# Patient Record
Sex: MALE | Race: WHITE | Employment: OTHER | URBAN - METROPOLITAN AREA
[De-identification: names, ages, dates, MRNs, and addresses within clinical notes are randomized per-mention and may not be internally consistent; named-entity substitution may affect disease eponyms.]

---

## 2017-01-01 ENCOUNTER — PATIENT OUTREACH (OUTPATIENT)
Dept: FAMILY MEDICINE CLINIC | Age: 77
End: 2017-01-01

## 2017-01-01 ENCOUNTER — TELEPHONE (OUTPATIENT)
Dept: FAMILY MEDICINE CLINIC | Age: 77
End: 2017-01-01

## 2017-01-01 ENCOUNTER — PATIENT OUTREACH (OUTPATIENT)
Dept: INTERNAL MEDICINE CLINIC | Age: 77
End: 2017-01-01

## 2017-01-01 ENCOUNTER — DOCUMENTATION ONLY (OUTPATIENT)
Dept: FAMILY MEDICINE CLINIC | Age: 77
End: 2017-01-01

## 2017-01-01 ENCOUNTER — OFFICE VISIT (OUTPATIENT)
Dept: FAMILY MEDICINE CLINIC | Age: 77
End: 2017-01-01

## 2017-01-01 ENCOUNTER — HOSPITAL ENCOUNTER (OUTPATIENT)
Dept: ULTRASOUND IMAGING | Age: 77
Discharge: HOME | End: 2017-09-06
Attending: INTERNAL MEDICINE
Payer: MEDICARE

## 2017-01-01 ENCOUNTER — DOCUMENTATION ONLY (OUTPATIENT)
Dept: INTERNAL MEDICINE CLINIC | Age: 77
End: 2017-01-01

## 2017-01-01 VITALS
SYSTOLIC BLOOD PRESSURE: 168 MMHG | BODY MASS INDEX: 24.11 KG/M2 | WEIGHT: 150 LBS | DIASTOLIC BLOOD PRESSURE: 63 MMHG | OXYGEN SATURATION: 96 % | HEIGHT: 66 IN | TEMPERATURE: 97.9 F | HEART RATE: 58 BPM | RESPIRATION RATE: 22 BRPM

## 2017-01-01 VITALS
DIASTOLIC BLOOD PRESSURE: 53 MMHG | WEIGHT: 119 LBS | HEART RATE: 60 BPM | SYSTOLIC BLOOD PRESSURE: 126 MMHG | TEMPERATURE: 98 F | RESPIRATION RATE: 16 BRPM | BODY MASS INDEX: 19.21 KG/M2 | OXYGEN SATURATION: 97 %

## 2017-01-01 VITALS
HEIGHT: 66 IN | OXYGEN SATURATION: 98 % | HEART RATE: 52 BPM | RESPIRATION RATE: 16 BRPM | WEIGHT: 150 LBS | TEMPERATURE: 98.4 F | DIASTOLIC BLOOD PRESSURE: 60 MMHG | SYSTOLIC BLOOD PRESSURE: 165 MMHG | BODY MASS INDEX: 24.11 KG/M2

## 2017-01-01 VITALS
DIASTOLIC BLOOD PRESSURE: 73 MMHG | RESPIRATION RATE: 18 BRPM | TEMPERATURE: 98 F | HEART RATE: 59 BPM | OXYGEN SATURATION: 95 % | SYSTOLIC BLOOD PRESSURE: 157 MMHG

## 2017-01-01 VITALS
DIASTOLIC BLOOD PRESSURE: 70 MMHG | TEMPERATURE: 97.9 F | HEART RATE: 66 BPM | RESPIRATION RATE: 18 BRPM | OXYGEN SATURATION: 97 % | SYSTOLIC BLOOD PRESSURE: 176 MMHG

## 2017-01-01 VITALS
OXYGEN SATURATION: 97 % | HEART RATE: 55 BPM | DIASTOLIC BLOOD PRESSURE: 54 MMHG | TEMPERATURE: 97.9 F | HEIGHT: 66 IN | RESPIRATION RATE: 18 BRPM | SYSTOLIC BLOOD PRESSURE: 140 MMHG | BODY MASS INDEX: 24.11 KG/M2 | WEIGHT: 150 LBS

## 2017-01-01 VITALS
DIASTOLIC BLOOD PRESSURE: 56 MMHG | SYSTOLIC BLOOD PRESSURE: 142 MMHG | OXYGEN SATURATION: 98 % | TEMPERATURE: 98.5 F | HEART RATE: 54 BPM | RESPIRATION RATE: 18 BRPM | HEIGHT: 66 IN

## 2017-01-01 VITALS
RESPIRATION RATE: 18 BRPM | TEMPERATURE: 98 F | DIASTOLIC BLOOD PRESSURE: 66 MMHG | SYSTOLIC BLOOD PRESSURE: 159 MMHG | HEART RATE: 79 BPM | OXYGEN SATURATION: 96 %

## 2017-01-01 DIAGNOSIS — S78.112A UNILATERAL AKA, LEFT (HCC): ICD-10-CM

## 2017-01-01 DIAGNOSIS — Z23 ENCOUNTER FOR IMMUNIZATION: ICD-10-CM

## 2017-01-01 DIAGNOSIS — G20 PARKINSON DISEASE (HCC): ICD-10-CM

## 2017-01-01 DIAGNOSIS — D68.9 COAGULATION DEFICIENCY (HCC): Primary | ICD-10-CM

## 2017-01-01 DIAGNOSIS — L89.91: Primary | ICD-10-CM

## 2017-01-01 DIAGNOSIS — I73.9 PVD (PERIPHERAL VASCULAR DISEASE) (HCC): Primary | ICD-10-CM

## 2017-01-01 DIAGNOSIS — I82.503 CHRONIC DEEP VEIN THROMBOSIS (DVT) OF BOTH LOWER EXTREMITIES, UNSPECIFIED VEIN (HCC): ICD-10-CM

## 2017-01-01 DIAGNOSIS — I10 ESSENTIAL HYPERTENSION: Chronic | ICD-10-CM

## 2017-01-01 DIAGNOSIS — N18.30 CRI (CHRONIC RENAL INSUFFICIENCY), STAGE 3 (MODERATE) (HCC): Chronic | ICD-10-CM

## 2017-01-01 DIAGNOSIS — N18.30 CKD (CHRONIC KIDNEY DISEASE) STAGE 3, GFR 30-59 ML/MIN (HCC): ICD-10-CM

## 2017-01-01 DIAGNOSIS — I48.20 CHRONIC ATRIAL FIBRILLATION (HCC): ICD-10-CM

## 2017-01-01 DIAGNOSIS — D68.9 COAGULATION DEFICIENCY (HCC): ICD-10-CM

## 2017-01-01 DIAGNOSIS — L89.301: Primary | ICD-10-CM

## 2017-01-01 LAB
INR BLD: 1.2
INR BLD: 1.3
INR BLD: 1.7
INR BLD: 2
INR BLD: 2
INR BLD: 2.1
INR BLD: 3.8
INR BLD: 4.6
PT POC: 24.5 SEC
PT POC: 25.5 SECONDS
PT POC: NORMAL SEC
PT POC: NORMAL SECONDS
VALID INTERNAL CONTROL?: YES

## 2017-01-01 PROCEDURE — 76770 US EXAM ABDO BACK WALL COMP: CPT

## 2017-01-01 RX ORDER — HYDRALAZINE HYDROCHLORIDE 50 MG/1
25 TABLET, FILM COATED ORAL 3 TIMES DAILY
COMMUNITY

## 2017-01-01 RX ORDER — LANOLIN ALCOHOL/MO/W.PET/CERES
CREAM (GRAM) TOPICAL
COMMUNITY

## 2017-01-01 RX ORDER — POTASSIUM CHLORIDE 20 MEQ/1
TABLET, EXTENDED RELEASE ORAL
Qty: 120 TAB | Refills: 3 | Status: SHIPPED | OUTPATIENT
Start: 2017-01-01 | End: 2017-01-01 | Stop reason: SDUPTHER

## 2017-01-01 RX ORDER — ACETAMINOPHEN 500 MG
500 TABLET ORAL
Qty: 100 TAB | Refills: 3 | Status: SHIPPED | OUTPATIENT
Start: 2017-01-01

## 2017-01-01 RX ORDER — DIGOXIN 125 MCG
TABLET ORAL DAILY
COMMUNITY

## 2017-01-01 RX ORDER — METOPROLOL TARTRATE 50 MG/1
TABLET ORAL 2 TIMES DAILY
COMMUNITY

## 2017-01-01 RX ORDER — FACIAL-BODY WIPES
10 EACH TOPICAL
COMMUNITY

## 2017-01-01 RX ORDER — AMLODIPINE BESYLATE 5 MG/1
TABLET ORAL
Qty: 90 TAB | Refills: 3 | Status: SHIPPED | OUTPATIENT
Start: 2017-01-01

## 2017-01-01 RX ORDER — FUROSEMIDE 40 MG/1
TABLET ORAL
COMMUNITY
Start: 2017-01-01

## 2017-01-01 RX ORDER — WARFARIN 6 MG/1
TABLET ORAL
Qty: 30 TAB | Refills: 3 | Status: SHIPPED | OUTPATIENT
Start: 2017-01-01

## 2017-01-01 RX ORDER — DULOXETIN HYDROCHLORIDE 30 MG/1
30 CAPSULE, DELAYED RELEASE ORAL
COMMUNITY

## 2017-01-01 RX ORDER — OXYCODONE AND ACETAMINOPHEN 5; 325 MG/1; MG/1
1 TABLET ORAL
Qty: 90 TAB | Refills: 0 | Status: SHIPPED | OUTPATIENT
Start: 2017-01-01

## 2017-01-01 RX ORDER — WARFARIN SODIUM 5 MG/1
TABLET ORAL
Qty: 90 TAB | Refills: 0 | Status: SHIPPED | OUTPATIENT
Start: 2017-01-01 | End: 2017-01-01

## 2017-01-01 RX ORDER — MONTELUKAST SODIUM 4 MG/1
TABLET, CHEWABLE ORAL
Qty: 60 TAB | Refills: 5 | Status: SHIPPED | OUTPATIENT
Start: 2017-01-01

## 2017-01-01 RX ORDER — OXYCODONE AND ACETAMINOPHEN 5; 325 MG/1; MG/1
1 TABLET ORAL
COMMUNITY
End: 2017-01-01 | Stop reason: SDUPTHER

## 2017-01-01 RX ORDER — WARFARIN 6 MG/1
6 TABLET ORAL DAILY
Qty: 30 TAB | Refills: 5 | Status: SHIPPED | OUTPATIENT
Start: 2017-01-01

## 2017-01-03 NOTE — MR AVS SNAPSHOT
Visit Information Date & Time Provider Department Dept. Phone Encounter #  
 1/3/2017  2:20 PM Franchesca Sanchez MD [address] [facility] [phone] [id number] Follow-up Instructions Return in about 2 weeks (around 1/17/2017). Upcoming Health Maintenance Date Due DTaP/Tdap/Td series (1 - Tdap) 9[birth year]19/1959 GLAUCOMA SCREENING Q2Y 8/6/2016 MEDICARE YEARLY EXAM 11/22/2017 Allergies as of 1/3/2017  Review Complete On: 1/3/2017 By: Franchesca Sanchez MD  
  
 Severity Noted Reaction Type Reactions Morphine  05/13/2010    Other (comments) Mental change  hallucinations Pcn [Penicillins]  05/12/2010    Hives Current Immunizations  Reviewed on 10/10/2016 Name Date Influenza High Dose Vaccine PF 10/10/2016 Influenza Vaccine 9/25/2013 Influenza Vaccine Karole Андрей) 10/12/2015 Influenza Vaccine Split 10/23/2012, 11/7/2011, 11/3/2010 Pneumococcal Conjugate (PCV-13) 10/12/2015 Pneumococcal Vaccine (Unspecified Type) 8/4/2009 Zoster Vaccine, Live 9/25/2013 Not reviewed this visit You Were Diagnosed With   
  
 Codes Comments Coagulation deficiency ([facility]    -  Primary ICD-10-CM: D69.9 ICD-9-CM: 286. 9 Vitals BP Pulse Temp Resp Height(growth percentile) Weight(growth percentile) 140/54 (!) 55 97.9 °F (36.6 °C) 18 5' 6\" (1.676 m) 150 lb (68 kg) SpO2 BMI Smoking Status 97% 24.21 kg/m2 Former Smoker BMI and BSA Data Body Mass Index Body Surface Area  
 24.21 kg/m 2 1.78 m 2 Preferred Pharmacy Pharmacy Name Phone [address] [facility] [phone] Your Updated Medication List  
  
   
This list is accurate as of: 1/3/17  2:44 PM.  Always use your most recent med list.  
  
  
  
  
 ALPRAZolam 0.25 mg tablet Commonly known as:  XANAX  
TAKE 1 TABLET BY MOUTH TWICE A DAY AS NEEDED FOR ANXIETY  
  
 amiodarone 200 mg tablet Commonly known as:  CORDARONE  
TAKE 1 TABLET BY MOUTH DAILY  
  
 amLODIPine 5 mg tablet Commonly known as:  [place] TAKE 1 TABLET BY MOUTH DAILY AZILECT 1 mg tablet Generic drug:  rasagiline Take 1 mg by mouth daily. TAKES AT NOON DAILY  
  
 carbidopa-levodopa  mg per tablet Commonly known as:  SINEMET  
TAKE 1 TABLET BY MOUTH THREE TIMES A DAY CENTRUM SILVER Tab tablet Generic drug:  multivitamins-minerals-lutein Take 1 Tab by mouth daily. clopidogrel 75 mg Tab Commonly known as:  PLAVIX Take 1 Tab by mouth daily. colestipol 1 gram tablet Commonly known as:  COLESTID  
TAKE 1 TABLET BY MOUTH TWICE A DAY  
  
 * digoxin 0.25 mg tablet Commonly known as:  LANOXIN  
TAKE 1 TABLET BY MOUTH DAILY * digoxin 0.25 mg tablet Commonly known as:  LANOXIN  
TAKE 1 TABLET BY MOUTH DAILY  
  
 gabapentin 300 mg capsule Commonly known as:  NEURONTIN  
TAKE 1 CAPSULE BY MOUTH NIGHTLY  
  
 hydroCHLOROthiazide 25 mg tablet Commonly known as:  HYDRODIURIL Take 25 mg by mouth daily. Indications: HYPERTENSION  
  
 * KLOR-CON 10 10 mEq tablet Generic drug:  potassium chloride SR Take 10 mEq by mouth every evening. * KLOR-CON M20 20 mEq tablet Generic drug:  potassium chloride TAKE 2 TABLETS BY MOUTH TWICE A DAY  
  
 LIPITOR 40 mg tablet Generic drug:  atorvastatin Take  by mouth nightly. metoprolol succinate 25 mg XL tablet Commonly known as:  TOPROL-XL  
TAKE 1 TABLET EVERY DAY  
  
 metoprolol tartrate 25 mg tablet Commonly known as:  LOPRESSOR Take 12.5 mg by mouth two (2) times a day. pantoprazole 40 mg tablet Commonly known as:  PROTONIX  
TAKE 1 TABLET BY MOUTH EVERY DAY  
  
 raNITIdine 150 mg tablet Commonly known as:  ZANTAC TAKE 1 TABLET BY MOUTH TWICE A DAY  
  
 VITAMIN C 500 mg tablet Generic drug:  ascorbic acid (vitamin C) Take 500 mg by mouth daily. * warfarin 2 mg tablet Commonly known as:  COUMADIN  
TAKE 2 AND 1/2 TABLETS BY MOUTH DAILY * warfarin 5 mg tablet Commonly known as:  COUMADIN  
TAKE 1 TABLET BY MOUTH EVERY DAY  
  
 * warfarin 3 mg tablet Commonly known as:  COUMADIN  
TAKE 1 TABLET BY MOUTH DIALY * warfarin 5 mg tablet Commonly known as:  COUMADIN  
TAKE 1 TABLET BY MOUTH EVERY DAY  
  
 * Notice: This list has 8 medication(s) that are the same as other medications prescribed for you. Read the directions carefully, and ask your doctor or other care provider to review them with you. Follow-up Instructions Return in about 2 weeks (around 1/17/2017). Introducing hospitals & [facility] SERVICES! Dear Gabbi Zapata: Thank you for requesting a [employer] account. Our records indicate that you have previously registered for a [employer] account but its currently inactive. Please call our [employer] support line at [phone]. Additional Information If you have questions, please visit the Frequently Asked Questions section of the [employer] website at [email or web address] Remember, [employer] is NOT to be used for urgent needs. For medical emergencies, dial 911. Now available from your iPhone and Android! Please provide this summary of care documentation to your next provider. Your primary care clinician is listed as ALFREDO LANDRY. If you have any questions after today's visit, please call [phone].

## 2017-01-03 NOTE — PROGRESS NOTES
Chief Complaint   Patient presents with    Coagulation disorder     5 mg daily     1. Have you been to the ER, urgent care clinic since your last visit? Hospitalized since your last visit? No    2. Have you seen or consulted any other health care providers outside of the [facility] [place] since your last visit? Include any pap smears or colon screening. No     Results for orders placed or performed in visit on 01/03/17   AMB POC PT/INR   Result Value Ref Range    VALID INTERNAL CONTROL POC Yes     Prothrombin time (POC)  sec    INR POC 1.3      Chief Complaint   Patient presents with    Coagulation disorder     5 mg daily     he is a 64y.o. year old male who presents for evalution. Reviewed PmHx, RxHx, FmHx, SocHx, AllgHx and updated and dated in the chart.     Patient Active Problem List    Diagnosis    Advance care planning     Has LW      DVT (deep venous thrombosis) (HCC)    AF (atrial fibrillation) (HCC)    GI bleed    Leucocytosis    Abdominal pain    Cellulitis of leg, right    TIA (transient ischemic attack)    Other peripheral vascular disease(443.89)    Renal failure    UTI (urinary tract infection)    Urinary retention    Parkinson disease ([facility]    Weakness of left leg    Hyperkalemia    Anemia    Insomnia    Hypercholesteremia    HTN (hypertension)    PVD (peripheral vascular disease) (HCC)    Carotid stenosis    GERD (gastroesophageal reflux disease)    CRI (chronic renal insufficiency)       Review of Systems - negative except as listed above in the HPI    Objective:     Vitals:    01/03/17 1434   BP: 140/54   Pulse: (!) 55   Resp: 18   Temp: 97.9 °F (36.6 °C)   SpO2: 97%   Weight: 150 lb (68 kg)   Height: 5' 6\" (1.676 m)     Physical Examination: General appearance - alert, well appearing, and in no distress  Chest - clear to auscultation, no wheezes, rales or rhonchi, symmetric air entry  Heart - normal rate, regular rhythm, normal S1, S2, no murmurs, rubs, clicks or gallops      Assessment/ Plan:   Yenifer Ku was seen today for coagulation disorder. Diagnoses and all orders for this visit:    Coagulation deficiency ([facility]  -     AMB POC PT/INR  -too thin at 1.3  -take double dose today and check in 2 weeks     Follow-up Disposition:  Return in about 2 weeks (around 1/17/2017). I have discussed the diagnosis with the patient and the intended plan as seen in the above orders. The patient understands and agrees with the plan. The patient has received an after-visit summary and questions were answered concerning future plans. Medication Side Effects and Warnings were discussed with patient  Patient Labs were reviewed and or requested:  Patient Past Records were reviewed and or requested    Ibis Cameron M.D. There are no Patient Instructions on file for this visit.

## 2017-01-18 NOTE — PROGRESS NOTES
Chief Complaint   Patient presents with    Coagulation disorder     1. Have you been to the ER, urgent care clinic since your last visit? Hospitalized since your last visit? No    2. Have you seen or consulted any other health care providers outside of the [address] [place] [address] since your last visit? Include any pap smears or colon screening. No     Results for orders placed or performed in visit on 01/18/17   AMB POC PT/INR   Result Value Ref Range    VALID INTERNAL CONTROL POC Yes     Prothrombin time (POC)  sec    INR POC 1.7      Chief Complaint   Patient presents with    Coagulation disorder     he is a 64y.o. year old male who presents for evalution. Reviewed PmHx, RxHx, FmHx, SocHx, AllgHx and updated and dated in the chart. Patient Active Problem List    Diagnosis    Advance care planning     Has LW      DVT (deep venous thrombosis) (HCC)    AF (atrial fibrillation) (HCC)    GI bleed    Leucocytosis    Abdominal pain    Cellulitis of leg, right    TIA (transient ischemic attack)    Other peripheral vascular disease(443.89)    Renal failure    UTI (urinary tract infection)    Urinary retention    Parkinson disease ([address]    Weakness of left leg    Hyperkalemia    Anemia    Insomnia    Hypercholesteremia    HTN (hypertension)    PVD (peripheral vascular disease) (HCC)    Carotid stenosis    GERD (gastroesophageal reflux disease)    CRI (chronic renal insufficiency)       Review of Systems - negative except as listed above in the HPI    Objective:     Vitals:    01/18/17 1612   BP: 157/73   Pulse: (!) 59   Resp: 18   Temp: 98 °F (36.7 °C)   SpO2: 95%     Physical Examination: General appearance - alert, well appearing, and in no distress      Assessment/ Plan:   John Sheffield was seen today for coagulation disorder. Diagnoses and all orders for this visit:    Coagulation deficiency ([address]  -     AMB POC PT/INR  -     warfarin (COUMADIN) 6 mg tablet;  Take 1 Tab by mouth daily.  -  Results for orders placed or performed in visit on 01/18/17   AMB POC PT/INR   Result Value Ref Range    VALID INTERNAL CONTROL POC Yes     Prothrombin time (POC)  sec    INR POC 1.7      -inc dose to 6mg   -not at goal    Chronic deep vein thrombosis (DVT) of both lower extremities, unspecified vein (HCC)  -     warfarin (COUMADIN) 6 mg tablet; Take 1 Tab by mouth daily. Follow-up Disposition:  Return in about 10 days (around 1/28/2017). I have discussed the diagnosis with the patient and the intended plan as seen in the above orders. The patient understands and agrees with the plan. The patient has received an after-visit summary and questions were answered concerning future plans. Medication Side Effects and Warnings were discussed with patient  Patient Labs were reviewed and or requested:  Patient Past Records were reviewed and or requested    Saurabh Carpenter M.D. There are no Patient Instructions on file for this visit.

## 2017-01-18 NOTE — MR AVS SNAPSHOT
Visit Information Date & Time Provider Department Dept. Phone Encounter #  
 1/18/2017  1:45 PM Daysi Fisher MD [address] [facility] [phone] [id number] Follow-up Instructions Return in about 10 days (around 1/28/2017). Upcoming Health Maintenance Date Due DTaP/Tdap/Td series (1 - Tdap) 9[birth year]/1959 GLAUCOMA SCREENING Q2Y 8/6/2016 MEDICARE YEARLY EXAM 11/22/2017 Allergies as of 1/18/2017  Review Complete On: 1/18/2017 By: Daysi Fisher MD  
  
 Severity Noted Reaction Type Reactions Morphine  05/13/2010    Other (comments) Mental change  hallucinations Pcn [Penicillins]  05/12/2010    Hives Current Immunizations  Reviewed on 10/10/2016 Name Date Influenza High Dose Vaccine PF 10/10/2016 Influenza Vaccine 9/25/2013 Influenza Vaccine Mittie Shouts) 10/12/2015 Influenza Vaccine Split 10/23/2012, 11/7/2011, 11/3/2010 Pneumococcal Conjugate (PCV-13) 10/12/2015 Pneumococcal Vaccine (Unspecified Type) 8/4/2009 Zoster Vaccine, Live 9/25/2013 Not reviewed this visit You Were Diagnosed With   
  
 Codes Comments Coagulation deficiency ([facility]    -  Primary ICD-10-CM: D69.9 ICD-9-CM: 286. 9 Chronic deep vein thrombosis (DVT) of both lower extremities, unspecified vein (HCC)     ICD-10-CM: N46.871 ICD-9-CM: 453.50 Vitals BP Pulse Temp Resp SpO2 Smoking Status 157/73 (!) 59 98 °F (36.7 °C) 18 95% Former Smoker Preferred Pharmacy Pharmacy Name Phone [id number] [address] [phone] Your Updated Medication List  
  
   
This list is accurate as of: 1/18/17  4:25 PM.  Always use your most recent med list.  
  
  
  
  
 ALPRAZolam 0.25 mg tablet Commonly known as:  XANAX  
TAKE 1 TABLET BY MOUTH TWICE A DAY AS NEEDED FOR ANXIETY  
  
 amiodarone 200 mg tablet Commonly known as:  CORDARONE  
TAKE 1 TABLET BY MOUTH DAILY  
  
 amLODIPine 5 mg tablet Commonly known as:  [place] TAKE 1 TABLET BY MOUTH DAILY AZILECT 1 mg tablet Generic drug:  rasagiline Take 1 mg by mouth daily. TAKES AT NOON DAILY  
  
 carbidopa-levodopa  mg per tablet Commonly known as:  SINEMET  
TAKE 1 TABLET BY MOUTH THREE TIMES A DAY CENTRUM SILVER Tab tablet Generic drug:  multivitamins-minerals-lutein Take 1 Tab by mouth daily. clopidogrel 75 mg Tab Commonly known as:  PLAVIX Take 1 Tab by mouth daily. colestipol 1 gram tablet Commonly known as:  COLESTID  
TAKE 1 TABLET BY MOUTH TWICE A DAY  
  
 * digoxin 0.25 mg tablet Commonly known as:  LANOXIN  
TAKE 1 TABLET BY MOUTH DAILY * digoxin 0.25 mg tablet Commonly known as:  LANOXIN  
TAKE 1 TABLET BY MOUTH DAILY  
  
 gabapentin 300 mg capsule Commonly known as:  NEURONTIN  
TAKE 1 CAPSULE BY MOUTH NIGHTLY  
  
 hydroCHLOROthiazide 25 mg tablet Commonly known as:  HYDRODIURIL Take 25 mg by mouth daily. Indications: HYPERTENSION  
  
 * KLOR-CON 10 10 mEq tablet Generic drug:  potassium chloride SR Take 10 mEq by mouth every evening. * KLOR-CON M20 20 mEq tablet Generic drug:  potassium chloride TAKE 2 TABLETS BY MOUTH TWICE A DAY  
  
 LIPITOR 40 mg tablet Generic drug:  atorvastatin Take  by mouth nightly. metoprolol succinate 25 mg XL tablet Commonly known as:  TOPROL-XL  
TAKE 1 TABLET EVERY DAY  
  
 metoprolol tartrate 25 mg tablet Commonly known as:  LOPRESSOR Take 12.5 mg by mouth two (2) times a day. pantoprazole 40 mg tablet Commonly known as:  PROTONIX  
TAKE 1 TABLET BY MOUTH EVERY DAY  
  
 raNITIdine 150 mg tablet Commonly known as:  ZANTAC TAKE 1 TABLET BY MOUTH TWICE A DAY  
  
 VITAMIN C 500 mg tablet Generic drug:  ascorbic acid (vitamin C) Take 500 mg by mouth daily. * warfarin 2 mg tablet Commonly known as:  COUMADIN  
TAKE 2 AND 1/2 TABLETS BY MOUTH DAILY * warfarin 5 mg tablet Commonly known as:  COUMADIN  
TAKE 1 TABLET BY MOUTH EVERY DAY  
  
 * warfarin 3 mg tablet Commonly known as:  COUMADIN  
TAKE 1 TABLET BY MOUTH DIALY * warfarin 5 mg tablet Commonly known as:  COUMADIN  
TAKE 1 TABLET BY MOUTH EVERY DAY  
  
 * warfarin 6 mg tablet Commonly known as:  COUMADIN Take 1 Tab by mouth daily. * Notice: This list has 9 medication(s) that are the same as other medications prescribed for you. Read the directions carefully, and ask your doctor or other care provider to review them with you. Prescriptions Printed Refills  
 warfarin (COUMADIN) 6 mg tablet 5 Sig: Take 1 Tab by mouth daily. Class: Print Route: Oral  
  
We Performed the Following AMB POC PT/INR [51528 CPT(R)] Follow-up Instructions Return in about 10 days (around 1/28/2017). Introducing [facility] [place] & HEALTH SERVICES! Dear Bee Sheldon: Thank you for requesting a [employer] account. Our records indicate that you have previously registered for a [employer] account but its currently inactive. Please call our [employer] support line at [phone]. Additional Information If you have questions, please visit the Frequently Asked Questions section of the [employer] website at [email or web address] Remember, [employer] is NOT to be used for urgent needs. For medical emergencies, dial 911. Now available from your iPhone and Android! Please provide this summary of care documentation to your next provider. Your primary care clinician is listed as ALFREDO LANDRY. If you have any questions after today's visit, please call [phone].

## 2017-01-26 NOTE — MR AVS SNAPSHOT
Visit Information Date & Time Provider Department Dept. Phone Encounter #  
 1/26/2017  2:10 PM Ailny Ellsworth MD [address] [facility] [phone] [id number] Follow-up Instructions Return in about 2 weeks (around 2/9/2017). Upcoming Health Maintenance Date Due DTaP/Tdap/Td series (1 - Tdap) 9/19/1961 GLAUCOMA SCREENING Q2Y 8/6/2016 MEDICARE YEARLY EXAM 11/22/2017 Allergies as of 1/26/2017  Review Complete On: 1/26/2017 By: Ailyn Ellsworth MD  
  
 Severity Noted Reaction Type Reactions Morphine  05/13/2010    Other (comments) Mental change  hallucinations Pcn [Penicillins]  05/12/2010    Hives Current Immunizations  Reviewed on 10/10/2016 Name Date Influenza High Dose Vaccine PF 10/10/2016 Influenza Vaccine 9/25/2013 Influenza Vaccine Anne Chelsey) 10/12/2015 Influenza Vaccine Split 10/23/2012, 11/7/2011, 11/3/2010 Pneumococcal Conjugate (PCV-13) 10/12/2015 Pneumococcal Vaccine (Unspecified Type) 8/4/2009 Zoster Vaccine, Live 9/25/2013 Not reviewed this visit You Were Diagnosed With   
  
 Codes Comments Coagulation deficiency ([facility]    -  Primary ICD-10-CM: D69.9 ICD-9-CM: 286. 9 Vitals BP Pulse Temp Resp Height(growth percentile) Weight(growth percentile)  
 168/63 (!) 58 97.9 °F (36.6 °C) 22 5' 6\" (1.676 m) 150 lb (68 kg) SpO2 BMI Smoking Status 96% 24.21 kg/m2 Former Smoker BMI and BSA Data Body Mass Index Body Surface Area  
 24.21 kg/m 2 1.78 m 2 Preferred Pharmacy Pharmacy Name Phone [id number] [address] [phone] Your Updated Medication List  
  
   
This list is accurate as of: 1/26/17  2:54 PM.  Always use your most recent med list.  
  
  
  
  
 ALPRAZolam 0.25 mg tablet Commonly known as:  XANAX  
TAKE 1 TABLET BY MOUTH TWICE A DAY AS NEEDED FOR ANXIETY  
  
 amiodarone 200 mg tablet Commonly known as:  CORDARONE  
TAKE 1 TABLET BY MOUTH DAILY  
  
 amLODIPine 5 mg tablet Commonly known as:  Patrice Ruths TAKE 1 TABLET BY MOUTH DAILY AZILECT 1 mg tablet Generic drug:  rasagiline Take 1 mg by mouth daily. TAKES AT NOON DAILY  
  
 carbidopa-levodopa  mg per tablet Commonly known as:  SINEMET  
TAKE 1 TABLET BY MOUTH THREE TIMES A DAY CENTRUM SILVER Tab tablet Generic drug:  multivitamins-minerals-lutein Take 1 Tab by mouth daily. clopidogrel 75 mg Tab Commonly known as:  PLAVIX Take 1 Tab by mouth daily. colestipol 1 gram tablet Commonly known as:  COLESTID  
TAKE 1 TABLET BY MOUTH TWICE A DAY  
  
 * digoxin 0.25 mg tablet Commonly known as:  LANOXIN  
TAKE 1 TABLET BY MOUTH DAILY * digoxin 0.25 mg tablet Commonly known as:  LANOXIN  
TAKE 1 TABLET BY MOUTH DAILY  
  
 gabapentin 300 mg capsule Commonly known as:  NEURONTIN  
TAKE 1 CAPSULE BY MOUTH NIGHTLY  
  
 hydroCHLOROthiazide 25 mg tablet Commonly known as:  HYDRODIURIL Take 25 mg by mouth daily. Indications: HYPERTENSION  
  
 * KLOR-CON 10 10 mEq tablet Generic drug:  potassium chloride SR Take 10 mEq by mouth every evening. * KLOR-CON M20 20 mEq tablet Generic drug:  potassium chloride TAKE 2 TABLETS BY MOUTH TWICE A DAY  
  
 LIPITOR 40 mg tablet Generic drug:  atorvastatin Take  by mouth nightly. metoprolol succinate 25 mg XL tablet Commonly known as:  TOPROL-XL  
TAKE 1 TABLET EVERY DAY  
  
 metoprolol tartrate 25 mg tablet Commonly known as:  LOPRESSOR Take 12.5 mg by mouth two (2) times a day. pantoprazole 40 mg tablet Commonly known as:  PROTONIX  
TAKE 1 TABLET BY MOUTH EVERY DAY  
  
 raNITIdine 150 mg tablet Commonly known as:  ZANTAC TAKE 1 TABLET BY MOUTH TWICE A DAY  
  
 VITAMIN C 500 mg tablet Generic drug:  ascorbic acid (vitamin C) Take 500 mg by mouth daily. * warfarin 2 mg tablet Commonly known as:  COUMADIN  
TAKE 2 AND 1/2 TABLETS BY MOUTH DAILY * warfarin 5 mg tablet Commonly known as:  COUMADIN  
TAKE 1 TABLET BY MOUTH EVERY DAY  
  
 * warfarin 3 mg tablet Commonly known as:  COUMADIN  
TAKE 1 TABLET BY MOUTH DIALY * warfarin 5 mg tablet Commonly known as:  COUMADIN  
TAKE 1 TABLET BY MOUTH EVERY DAY  
  
 * warfarin 6 mg tablet Commonly known as:  COUMADIN Take 1 Tab by mouth daily. * Notice: This list has 9 medication(s) that are the same as other medications prescribed for you. Read the directions carefully, and ask your doctor or other care provider to review them with you. We Performed the Following AMB POC PT/INR [86101 CPT(R)] Follow-up Instructions Return in about 2 weeks (around 2/9/2017). Introducing [facility] & HEALTH SERVICES! Dear Francesca Santos: Thank you for requesting a [employer] account. Our records indicate that you have previously registered for a [employer] account but its currently inactive. Please call our [employer] support line at [phone]. Additional Information If you have questions, please visit the Frequently Asked Questions section of the [employer] website at [email or web address] Remember, [employer] is NOT to be used for urgent needs. For medical emergencies, dial 911. Now available from your iPhone and Android! Please provide this summary of care documentation to your next provider. Your primary care clinician is listed as ALFREDO LANDRY. If you have any questions after today's visit, please call [phone].

## 2017-01-26 NOTE — PROGRESS NOTES
Chief Complaint   Patient presents with    Coagulation disorder   [facility] Follow Up     weakness, currently has PT coming to home twice per weak. 1. Have you been to the ER, urgent care clinic since your last visit? Hospitalized since your last visit? Yes When: Lizzie Tuesday 1/24/2017    2. Have you seen or consulted any other health care providers outside of the [address] [place] [address] since your last visit? Include any pap smears or colon screening. No     Results for orders placed or performed in visit on 01/26/17   AMB POC PT/INR   Result Value Ref Range    VALID INTERNAL CONTROL POC Yes     Prothrombin time (POC)  sec    INR POC 3.8        Chief Complaint   Patient presents with    Coagulation disorder   [facility] Follow Up     weakness, currently has PT coming to home twice per weak.      he is a 64y.o. year old male who presents for evalution. Reviewed PmHx, RxHx, FmHx, SocHx, AllgHx and updated and dated in the chart.     Patient Active Problem List    Diagnosis    Advance care planning     Has LW      DVT (deep venous thrombosis) (HCC)    AF (atrial fibrillation) (HCC)    GI bleed    Leucocytosis    Abdominal pain    Cellulitis of leg, right    TIA (transient ischemic attack)    Other peripheral vascular disease(443.89)    Renal failure    UTI (urinary tract infection)    Urinary retention    Parkinson disease ([facility]    Weakness of left leg    Hyperkalemia    Anemia    Insomnia    Hypercholesteremia    HTN (hypertension)    PVD (peripheral vascular disease) (HCC)    Carotid stenosis    GERD (gastroesophageal reflux disease)    CRI (chronic renal insufficiency)       Review of Systems - negative except as listed above in the HPI    Objective:     Vitals:    01/26/17 1451   BP: 168/63   Pulse: (!) 58   Resp: 22   Temp: 97.9 °F (36.6 °C)   SpO2: 96%   Weight: 150 lb (68 kg)   Height: 5' 6\" (1.676 m)     Physical Examination: General appearance - alert, well appearing, and in no distress  Chest - clear to auscultation, no wheezes, rales or rhonchi, symmetric air entry  Heart - normal rate, regular rhythm, normal S1, S2, no murmurs, rubs, clicks or gallops    Assessment/ Plan:   Flores Bautista was seen today for coagulation disorder and hospital follow up. Diagnoses and all orders for this visit:    Coagulation deficiency ([address]  -     AMB POC PT/INR  -too thin  -back to 5 mg       Follow-up Disposition:  Return in about 2 weeks (around 2/9/2017). I have discussed the diagnosis with the patient and the intended plan as seen in the above orders. The patient understands and agrees with the plan. The patient has received an after-visit summary and questions were answered concerning future plans. Medication Side Effects and Warnings were discussed with patient  Patient Labs were reviewed and or requested:  Patient Past Records were reviewed and or requested    Kishore Cardenas M.D. There are no Patient Instructions on file for this visit.

## 2017-02-09 NOTE — PROGRESS NOTES
Patient here for inr 2 week f/u. 6 mg coumadin daily. 1. Have you been to the ER, urgent care clinic since your last visit? Hospitalized since your last visit? No    2. Have you seen or consulted any other health care providers outside of the [facility] since your last visit? Include any pap smears or colon screening. No   Results for orders placed or performed in visit on 02/09/17   AMB POC PT/INR   Result Value Ref Range    VALID INTERNAL CONTROL POC Yes     Prothrombin time (POC)  sec    INR POC 4.6        Chief Complaint   Patient presents with    Coagulation disorder     2 week f/u     he is a 64y.o. year old male who presents for evalution. Reviewed PmHx, RxHx, FmHx, SocHx, AllgHx and updated and dated in the chart.     Patient Active Problem List    Diagnosis    Advance care planning     Has LW      DVT (deep venous thrombosis) (HCC)    AF (atrial fibrillation) (HCC)    GI bleed    Leucocytosis    Abdominal pain    Cellulitis of leg, right    TIA (transient ischemic attack)    Other peripheral vascular disease(443.89)    Renal failure    UTI (urinary tract infection)    Urinary retention    Parkinson disease ([facility] [address] [address]    Weakness of left leg    Hyperkalemia    Anemia    Insomnia    Hypercholesteremia    HTN (hypertension)    PVD (peripheral vascular disease) (HCC)    Carotid stenosis    GERD (gastroesophageal reflux disease)    CRI (chronic renal insufficiency)       Review of Systems - negative except as listed above in the HPI    Objective:     Vitals:    02/09/17 1458   BP: 176/70   Pulse: 66   Resp: 18   Temp: 97.9 °F (36.6 °C)   SpO2: 97%     Physical Examination: General appearance - alert, well appearing, and in no distress  Chest - clear to auscultation, no wheezes, rales or rhonchi, symmetric air entry  Heart - normal rate, regular rhythm, normal S1, S2, no murmurs, rubs, clicks or gallops    Assessment/ Plan:   Yenifer Ku was seen today for coagulation disorder. Diagnoses and all orders for this visit:    Coagulation deficiency ([facility]  -     AMB POC PT/INR  -too thin, did not dec to 5   -dec to 5 and hold tonights dose       Follow-up Disposition:  Return in about 2 weeks (around 2/23/2017). I have discussed the diagnosis with the patient and the intended plan as seen in the above orders. The patient understands and agrees with the plan. The patient has received an after-visit summary and questions were answered concerning future plans. Medication Side Effects and Warnings were discussed with patient  Patient Labs were reviewed and or requested:  Patient Past Records were reviewed and or requested    Sonia Ferro M.D. There are no Patient Instructions on file for this visit.

## 2017-02-09 NOTE — PROGRESS NOTES
Patient here for inr 2 week f/u. 6 mg coumadin daily. 1. Have you been to the ER, urgent care clinic since your last visit? Hospitalized since your last visit? No    2. Have you seen or consulted any other health care providers outside of the [address] [place] [address] since your last visit? Include any pap smears or colon screening. No   Results for orders placed or performed in visit on 02/09/17   AMB POC PT/INR   Result Value Ref Range    VALID INTERNAL CONTROL POC Yes     Prothrombin time (POC)  sec    INR POC 4.6        Chief Complaint   Patient presents with    Coagulation disorder     2 week f/u     he is a 64y.o. year old male who presents for evalution. Reviewed PmHx, RxHx, FmHx, SocHx, AllgHx and updated and dated in the chart.     Patient Active Problem List    Diagnosis    Advance care planning     Has LW      DVT (deep venous thrombosis) (HCC)    AF (atrial fibrillation) (HCC)    GI bleed    Leucocytosis    Abdominal pain    Cellulitis of leg, right    TIA (transient ischemic attack)    Other peripheral vascular disease(443.89)    Renal failure    UTI (urinary tract infection)    Urinary retention    Parkinson disease ([facility] [address]    Weakness of left leg    Hyperkalemia    Anemia    Insomnia    Hypercholesteremia    HTN (hypertension)    PVD (peripheral vascular disease) (HCC)    Carotid stenosis    GERD (gastroesophageal reflux disease)    CRI (chronic renal insufficiency)       Review of Systems - negative except as listed above in the HPI    Objective:     Vitals:    02/09/17 1458   BP: 176/70   Pulse: 66   Resp: 18   Temp: 97.9 °F (36.6 °C)   SpO2: 97%     Physical Examination: General appearance - alert, well appearing, and in no distress  Chest - clear to auscultation, no wheezes, rales or rhonchi, symmetric air entry  Heart - normal rate, regular rhythm, normal S1, S2, no murmurs, rubs, clicks or gallops    Assessment/ Plan:   Bee Sheldon was seen today for coagulation disorder. Diagnoses and all orders for this visit:    Coagulation deficiency ([facility] [address]  -     AMB POC PT/INR  -too thin, did not dec to 5   -dec to 5 and hold tonights dose       Follow-up Disposition:  Return in about 2 weeks (around 2/23/2017). I have discussed the diagnosis with the patient and the intended plan as seen in the above orders. The patient understands and agrees with the plan. The patient has received an after-visit summary and questions were answered concerning future plans. Medication Side Effects and Warnings were discussed with patient  Patient Labs were reviewed and or requested:  Patient Past Records were reviewed and or requested    Kaleb Nj M.D. There are no Patient Instructions on file for this visit.

## 2017-02-09 NOTE — MR AVS SNAPSHOT
Visit Information Date & Time Provider Department Dept. Phone Encounter #  
 2/9/2017  2:10 PM Patricia Elias MD [address] [facility] [phone] [id number] Follow-up Instructions Return in about 2 weeks (around 2/23/2017). Upcoming Health Maintenance Date Due DTaP/Tdap/Td series (1 - Tdap) 9/19/1961 MEDICARE YEARLY EXAM 11/22/2017 GLAUCOMA SCREENING Q2Y 1/26/2019 Allergies as of 2/9/2017  Review Complete On: 2/9/2017 By: Patricia Elias MD  
  
 Severity Noted Reaction Type Reactions Morphine  05/13/2010    Other (comments) Mental change  hallucinations Pcn [Penicillins]  05/12/2010    Hives Current Immunizations  Reviewed on 10/10/2016 Name Date Influenza High Dose Vaccine PF 10/10/2016 Influenza Vaccine 9/25/2013 Influenza Vaccine Helen Miu) 10/12/2015 Influenza Vaccine Split 10/23/2012, 11/7/2011, 11/3/2010 Pneumococcal Conjugate (PCV-13) 10/12/2015 Pneumococcal Vaccine (Unspecified Type) 8/4/2009 Zoster Vaccine, Live 9/25/2013 Not reviewed this visit You Were Diagnosed With   
  
 Codes Comments Coagulation deficiency ([facility]    -  Primary ICD-10-CM: D69.9 ICD-9-CM: 286. 9 Vitals BP Pulse Temp Resp SpO2 Smoking Status 176/70 66 97.9 °F (36.6 °C) 18 97% Former Smoker Preferred Pharmacy Pharmacy Name Phone [id number] [address] [phone] Your Updated Medication List  
  
   
This list is accurate as of: 2/9/17  3:05 PM.  Always use your most recent med list.  
  
  
  
  
 ALPRAZolam 0.25 mg tablet Commonly known as:  XANAX  
TAKE 1 TABLET BY MOUTH TWICE A DAY AS NEEDED FOR ANXIETY  
  
 amiodarone 200 mg tablet Commonly known as:  CORDARONE  
TAKE 1 TABLET BY MOUTH DAILY  
  
 amLODIPine 5 mg tablet Commonly known as:  Narayan Alstrom TAKE 1 TABLET BY MOUTH DAILY AZILECT 1 mg tablet Generic drug:  rasagiline Take 1 mg by mouth daily. TAKES AT NOON DAILY  
  
 carbidopa-levodopa  mg per tablet Commonly known as:  SINEMET  
TAKE 1 TABLET BY MOUTH THREE TIMES A DAY CENTRUM SILVER Tab tablet Generic drug:  multivitamins-minerals-lutein Take 1 Tab by mouth daily. clopidogrel 75 mg Tab Commonly known as:  PLAVIX Take 1 Tab by mouth daily. colestipol 1 gram tablet Commonly known as:  COLESTID  
TAKE 1 TABLET BY MOUTH TWICE A DAY  
  
 * digoxin 0.25 mg tablet Commonly known as:  LANOXIN  
TAKE 1 TABLET BY MOUTH DAILY * digoxin 0.25 mg tablet Commonly known as:  LANOXIN  
TAKE 1 TABLET BY MOUTH DAILY  
  
 gabapentin 300 mg capsule Commonly known as:  NEURONTIN  
TAKE 1 CAPSULE BY MOUTH NIGHTLY  
  
 hydroCHLOROthiazide 25 mg tablet Commonly known as:  HYDRODIURIL Take 25 mg by mouth daily. Indications: HYPERTENSION  
  
 * KLOR-CON 10 10 mEq tablet Generic drug:  potassium chloride SR Take 10 mEq by mouth every evening. * KLOR-CON M20 20 mEq tablet Generic drug:  potassium chloride TAKE 2 TABLETS BY MOUTH TWICE A DAY  
  
 LIPITOR 40 mg tablet Generic drug:  atorvastatin Take  by mouth nightly. metoprolol succinate 25 mg XL tablet Commonly known as:  TOPROL-XL  
TAKE 1 TABLET EVERY DAY  
  
 metoprolol tartrate 25 mg tablet Commonly known as:  LOPRESSOR Take 12.5 mg by mouth two (2) times a day. pantoprazole 40 mg tablet Commonly known as:  PROTONIX  
TAKE 1 TABLET BY MOUTH EVERY DAY  
  
 raNITIdine 150 mg tablet Commonly known as:  ZANTAC TAKE 1 TABLET BY MOUTH TWICE A DAY  
  
 VITAMIN C 500 mg tablet Generic drug:  ascorbic acid (vitamin C) Take 500 mg by mouth daily. * warfarin 2 mg tablet Commonly known as:  COUMADIN  
TAKE 2 AND 1/2 TABLETS BY MOUTH DAILY * warfarin 5 mg tablet Commonly known as:  COUMADIN  
TAKE 1 TABLET BY MOUTH EVERY DAY  
  
 * warfarin 3 mg tablet Commonly known as:  COUMADIN  
 TAKE 1 TABLET BY MOUTH DIALY * warfarin 5 mg tablet Commonly known as:  COUMADIN  
TAKE 1 TABLET BY MOUTH EVERY DAY  
  
 * warfarin 6 mg tablet Commonly known as:  COUMADIN Take 1 Tab by mouth daily. * Notice: This list has 9 medication(s) that are the same as other medications prescribed for you. Read the directions carefully, and ask your doctor or other care provider to review them with you. We Performed the Following AMB POC PT/INR [53885 CPT(R)] Follow-up Instructions Return in about 2 weeks (around 2/23/2017). Introducing [facility] & [facility] SERVICES! Dear Mary Hazel: Thank you for requesting a [employer] account. Our records indicate that you have previously registered for a [employer] account but its currently inactive. Please call our [employer] support line at [phone]. Additional Information If you have questions, please visit the Frequently Asked Questions section of the [employer] website at [email or web address] Remember, [employer] is NOT to be used for urgent needs. For medical emergencies, dial 911. Now available from your iPhone and Android! Please provide this summary of care documentation to your next provider. Your primary care clinician is listed as ALFREDO LANDRY. If you have any questions after today's visit, please call [phone].

## 2017-02-16 NOTE — TELEPHONE ENCOUNTER
Clement Greco from [facility] discharging patient because he is as well as he can get. He will still continue getting checked due to skin tear left sacral area. They are treating it with a barrium cream. Also his bp was elevated this am with no symptums, 189/80. She told him and wife to monitor it and if sx occure or it gets higher to call our office.

## 2017-02-23 NOTE — PROGRESS NOTES
Patient here for inr check. % mg coumadin daily. Results for orders placed or performed in visit on 02/23/17   AMB POC PT/INR   Result Value Ref Range    VALID INTERNAL CONTROL POC Yes     Prothrombin time (POC)  sec    INR POC 2.0        1. Have you been to the ER, urgent care clinic since your last visit? Hospitalized since your last visit? No    2. Have you seen or consulted any other health care providers outside of the [address] [place] [address] since your last visit? Include any pap smears or colon screening. No     Chief Complaint   Patient presents with    Coagulation disorder     inr     he is a 64y.o. year old male who presents for evalution. Reviewed PmHx, RxHx, FmHx, SocHx, AllgHx and updated and dated in the chart.     Patient Active Problem List    Diagnosis    Advance care planning     Has LW      DVT (deep venous thrombosis) (HCC)    AF (atrial fibrillation) (HCC)    GI bleed    Leucocytosis    Abdominal pain    Cellulitis of leg, right    TIA (transient ischemic attack)    Other peripheral vascular disease(443.89)    Renal failure    UTI (urinary tract infection)    Urinary retention    Parkinson disease ([facility] [address]    Weakness of left leg    Hyperkalemia    Anemia    Insomnia    Hypercholesteremia    HTN (hypertension)    PVD (peripheral vascular disease) (HCC)    Carotid stenosis    GERD (gastroesophageal reflux disease)    CRI (chronic renal insufficiency)       Review of Systems - negative except as listed above in the HPI    Objective:     Vitals:    02/23/17 1452   BP: 159/66   Pulse: 79   Resp: 18   Temp: 98 °F (36.7 °C)   SpO2: 96%     Physical Examination: General appearance - alert, well appearing, and in no distress  Chest - clear to auscultation, no wheezes, rales or rhonchi, symmetric air entry  Heart - normal rate, regular rhythm, normal S1, S2, no murmurs, rubs, clicks or gallops      Assessment/ Plan:   Corey Tolu was seen today for coagulation disorder. Diagnoses and all orders for this visit:    Coagulation deficiency ([facility]  -     AMB POC PT/INR=2  -at goal    Essential hypertension  -ok for pt       Follow-up Disposition:  Return in about 1 month (around 3/23/2017). I have discussed the diagnosis with the patient and the intended plan as seen in the above orders. The patient understands and agrees with the plan. The patient has received an after-visit summary and questions were answered concerning future plans. Medication Side Effects and Warnings were discussed with patient  Patient Labs were reviewed and or requested:  Patient Past Records were reviewed and or requested    Pallavi Kay M.D. There are no Patient Instructions on file for this visit.

## 2017-02-23 NOTE — MR AVS SNAPSHOT
Visit Information Date & Time Provider Department Dept. Phone Encounter #  
 2/23/2017  2:10 PM Jacque Lam MD [address] [facility] [phone] [id number] Follow-up Instructions Return in about 1 month (around 3/23/2017). Upcoming Health Maintenance Date Due DTaP/Tdap/Td series (1 - Tdap) 9/19/1961 MEDICARE YEARLY EXAM 11/22/2017 GLAUCOMA SCREENING Q2Y 1/26/2019 Allergies as of 2/23/2017  Review Complete On: 2/23/2017 By: Jacque Lam MD  
  
 Severity Noted Reaction Type Reactions Morphine  05/13/2010    Other (comments) Mental change  hallucinations Pcn [Penicillins]  05/12/2010    Hives Current Immunizations  Reviewed on 10/10/2016 Name Date Influenza High Dose Vaccine PF 10/10/2016 Influenza Vaccine 9/25/2013 Influenza Vaccine Jamshid Chesterfield) 10/12/2015 Influenza Vaccine Split 10/23/2012, 11/7/2011, 11/3/2010 Pneumococcal Conjugate (PCV-13) 10/12/2015 Pneumococcal Vaccine (Unspecified Type) 8/4/2009 Zoster Vaccine, Live 9/25/2013 Not reviewed this visit You Were Diagnosed With   
  
 Codes Comments Coagulation deficiency ([facility]    -  Primary ICD-10-CM: D69.9 ICD-9-CM: 286.9 Essential hypertension     ICD-10-CM: I10 
ICD-9-CM: 401.9 Vitals BP  
  
  
  
  
  
 [address] Preferred Pharmacy Pharmacy Name Phone [address], [facility] [phone] Your Updated Medication List  
  
   
This list is accurate as of: 2/23/17  3:18 PM.  Always use your most recent med list.  
  
  
  
  
 ALPRAZolam 0.25 mg tablet Commonly known as:  XANAX  
TAKE 1 TABLET BY MOUTH TWICE A DAY AS NEEDED FOR ANXIETY  
  
 amiodarone 200 mg tablet Commonly known as:  CORDARONE  
TAKE 1 TABLET BY MOUTH DAILY  
  
 amLODIPine 5 mg tablet Commonly known as:  [place] TAKE 1 TABLET BY MOUTH DAILY AZILECT 1 mg tablet Generic drug:  rasagiline Take 1 mg by mouth daily. TAKES AT NOON DAILY  
  
 carbidopa-levodopa  mg per tablet Commonly known as:  SINEMET  
TAKE 1 TABLET BY MOUTH THREE TIMES A DAY CENTRUM SILVER Tab tablet Generic drug:  multivitamins-minerals-lutein Take 1 Tab by mouth daily. clopidogrel 75 mg Tab Commonly known as:  PLAVIX Take 1 Tab by mouth daily. colestipol 1 gram tablet Commonly known as:  COLESTID  
TAKE 1 TABLET BY MOUTH TWICE A DAY  
  
 * digoxin 0.25 mg tablet Commonly known as:  LANOXIN  
TAKE 1 TABLET BY MOUTH DAILY * digoxin 0.25 mg tablet Commonly known as:  LANOXIN  
TAKE 1 TABLET BY MOUTH DAILY  
  
 gabapentin 300 mg capsule Commonly known as:  NEURONTIN  
TAKE 1 CAPSULE BY MOUTH NIGHTLY  
  
 hydroCHLOROthiazide 25 mg tablet Commonly known as:  HYDRODIURIL Take 25 mg by mouth daily. Indications: HYPERTENSION  
  
 * KLOR-CON 10 10 mEq tablet Generic drug:  potassium chloride SR Take 10 mEq by mouth every evening. * KLOR-CON M20 20 mEq tablet Generic drug:  potassium chloride TAKE 2 TABLETS BY MOUTH TWICE A DAY  
  
 LIPITOR 40 mg tablet Generic drug:  atorvastatin Take  by mouth nightly. metoprolol succinate 25 mg XL tablet Commonly known as:  TOPROL-XL  
TAKE 1 TABLET EVERY DAY  
  
 metoprolol tartrate 25 mg tablet Commonly known as:  LOPRESSOR Take 12.5 mg by mouth two (2) times a day. pantoprazole 40 mg tablet Commonly known as:  PROTONIX  
TAKE 1 TABLET BY MOUTH EVERY DAY  
  
 raNITIdine 150 mg tablet Commonly known as:  ZANTAC TAKE 1 TABLET BY MOUTH TWICE A DAY  
  
 VITAMIN C 500 mg tablet Generic drug:  ascorbic acid (vitamin C) Take 500 mg by mouth daily. * warfarin 2 mg tablet Commonly known as:  COUMADIN  
TAKE 2 AND 1/2 TABLETS BY MOUTH DAILY * warfarin 5 mg tablet Commonly known as:  COUMADIN  
TAKE 1 TABLET BY MOUTH EVERY DAY  
  
 * warfarin 3 mg tablet Commonly known as:  COUMADIN  
 TAKE 1 TABLET BY MOUTH DIALY * warfarin 5 mg tablet Commonly known as:  COUMADIN  
TAKE 1 TABLET BY MOUTH EVERY DAY  
  
 * warfarin 6 mg tablet Commonly known as:  COUMADIN Take 1 Tab by mouth daily. * Notice: This list has 9 medication(s) that are the same as other medications prescribed for you. Read the directions carefully, and ask your doctor or other care provider to review them with you. We Performed the Following AMB POC PT/INR [25331 CPT(R)] Follow-up Instructions Return in about 1 month (around 3/23/2017). Introducing [facility] SERVICES! Jerald Devine introduces [employer] patient portal. Now you can access parts of your medical record, email your doctor's office, and request medication refills online. 1. In your internet browser, go to [email or web address] 2. Click on the First Time User? Click Here link in the Sign In box. You will see the New Member Sign Up page. 3. Enter your [employer] Access Code exactly as it appears below. You will not need to use this code after youve completed the sign-up process. If you do not sign up before the expiration date, you must request a new code. · [employer] Access Code: [email or web address] Expires: 5/24/2017  3:16 PM 
 
4. Enter the last four digits of your Social Security Number (xxxx) and Date of Birth (mm/dd/yyyy) as indicated and click Submit. You will be taken to the next sign-up page. 5. Create a [employer] ID. This will be your [employer] login ID and cannot be changed, so think of one that is secure and easy to remember. 6. Create a [employer] password. You can change your password at any time. 7. Enter your Password Reset Question and Answer. This can be used at a later time if you forget your password. 8. Enter your e-mail address. You will receive e-mail notification when new information is available in [address] 9. Click Sign Up. You can now view and download portions of your medical record. 10. Click the Download Summary menu link to download a portable copy of your medical information. If you have questions, please visit the Frequently Asked Questions section of the [employer] website. Remember, [employer] is NOT to be used for urgent needs. For medical emergencies, dial 911. Now available from your iPhone and Android! Please provide this summary of care documentation to your next provider. Your primary care clinician is listed as ALFREDO LANDRY. If you have any questions after today's visit, please call [phone].

## 2017-03-23 NOTE — MR AVS SNAPSHOT
Visit Information Date & Time Provider Department Dept. Phone Encounter #  
 3/23/2017  2:10 PM Oneyda Guthrie MD [address] [phone] [id number] Follow-up Instructions Return in about 6 weeks (around 5/4/2017). Upcoming Health Maintenance Date Due DTaP/Tdap/Td series (1 - Tdap) 9/19/1961 MEDICARE YEARLY EXAM 11/22/2017 GLAUCOMA SCREENING Q2Y 1/26/2019 Allergies as of 3/23/2017  Review Complete On: 3/23/2017 By: Oneyda Guthrie MD  
  
 Severity Noted Reaction Type Reactions Morphine  05/13/2010    Other (comments) Mental change  hallucinations Pcn [Penicillins]  05/12/2010    Hives Current Immunizations  Reviewed on 10/10/2016 Name Date Influenza High Dose Vaccine PF 10/10/2016 Influenza Vaccine 9/25/2013 Influenza Vaccine Roberta Ana) 10/12/2015 Influenza Vaccine Split 10/23/2012, 11/7/2011, 11/3/2010 Pneumococcal Conjugate (PCV-13) 10/12/2015 Pneumococcal Vaccine (Unspecified Type) 8/4/2009 Zoster Vaccine, Live 9/25/2013 Not reviewed this visit You Were Diagnosed With   
  
 Codes Comments Decubitus ulcer, buttock, unspecified laterality, stage I    -  Primary ICD-10-CM: Y91.208 ICD-9-CM: 707.05, 707.21 Chronic atrial fibrillation (HCC)     ICD-10-CM: L75.3 ICD-9-CM: 427.31 Essential hypertension     ICD-10-CM: I10 
ICD-9-CM: 401.9 Vitals BP Pulse Temp Resp Height(growth percentile) Weight(growth percentile)  
 165/60 (!) 52 98.4 °F (36.9 °C) (Oral) 16 5' 6\" (1.676 m) 150 lb (68 kg) SpO2 BMI Smoking Status 98% 24.21 kg/m2 Former Smoker Vitals History BMI and BSA Data Body Mass Index Body Surface Area  
 24.21 kg/m 2 1.78 m 2 Preferred Pharmacy Pharmacy Name Phone [address], [facility] [phone] Your Updated Medication List  
  
   
 This list is accurate as of: 3/23/17  2:57 PM.  Always use your most recent med list.  
  
  
  
  
 ALPRAZolam 0.25 mg tablet Commonly known as:  XANAX  
TAKE 1 TABLET BY MOUTH TWICE A DAY AS NEEDED FOR ANXIETY  
  
 amiodarone 200 mg tablet Commonly known as:  CORDARONE  
TAKE 1 TABLET BY MOUTH DAILY  
  
 amLODIPine 5 mg tablet Commonly known as:  Judie Colon TAKE 1 TABLET BY MOUTH DAILY AZILECT 1 mg tablet Generic drug:  rasagiline Take 1 mg by mouth daily. TAKES AT NOON DAILY  
  
 carbidopa-levodopa  mg per tablet Commonly known as:  SINEMET  
TAKE 1 TABLET BY MOUTH THREE TIMES A DAY CENTRUM SILVER Tab tablet Generic drug:  multivitamins-minerals-lutein Take 1 Tab by mouth daily. clopidogrel 75 mg Tab Commonly known as:  PLAVIX Take 1 Tab by mouth daily. colestipol 1 gram tablet Commonly known as:  COLESTID  
TAKE 1 TABLET BY MOUTH TWICE A DAY  
  
 * digoxin 0.25 mg tablet Commonly known as:  LANOXIN  
TAKE 1 TABLET BY MOUTH DAILY * digoxin 0.25 mg tablet Commonly known as:  LANOXIN  
TAKE 1 TABLET BY MOUTH DAILY  
  
 gabapentin 300 mg capsule Commonly known as:  NEURONTIN  
TAKE 1 CAPSULE BY MOUTH NIGHTLY  
  
 hydroCHLOROthiazide 25 mg tablet Commonly known as:  HYDRODIURIL Take 25 mg by mouth daily. Indications: HYPERTENSION  
  
 * KLOR-CON 10 10 mEq tablet Generic drug:  potassium chloride SR Take 10 mEq by mouth every evening. * potassium chloride 20 mEq tablet Commonly known as:  KLOR-CON M20  
TAKE 2 TABLETS BY MOUTH TWICE A DAY  
  
 LIPITOR 40 mg tablet Generic drug:  atorvastatin Take  by mouth nightly. metoprolol succinate 25 mg XL tablet Commonly known as:  TOPROL-XL  
TAKE 1 TABLET EVERY DAY  
  
 metoprolol tartrate 25 mg tablet Commonly known as:  LOPRESSOR Take 12.5 mg by mouth two (2) times a day. pantoprazole 40 mg tablet Commonly known as:  PROTONIX  
TAKE 1 TABLET BY MOUTH EVERY DAY  
 raNITIdine 150 mg tablet Commonly known as:  ZANTAC TAKE 1 TABLET BY MOUTH TWICE A DAY  
  
 VITAMIN C 500 mg tablet Generic drug:  ascorbic acid (vitamin C) Take 500 mg by mouth daily. * warfarin 2 mg tablet Commonly known as:  COUMADIN  
TAKE 2 AND 1/2 TABLETS BY MOUTH DAILY * warfarin 5 mg tablet Commonly known as:  COUMADIN  
TAKE 1 TABLET BY MOUTH EVERY DAY  
  
 * warfarin 3 mg tablet Commonly known as:  COUMADIN  
TAKE 1 TABLET BY MOUTH DIALY * warfarin 5 mg tablet Commonly known as:  COUMADIN  
TAKE 1 TABLET BY MOUTH EVERY DAY  
  
 * warfarin 6 mg tablet Commonly known as:  COUMADIN Take 1 Tab by mouth daily. * Notice: This list has 9 medication(s) that are the same as other medications prescribed for you. Read the directions carefully, and ask your doctor or other care provider to review them with you. We Performed the Following AMB POC PT/INR [05556 CPT(R)] Follow-up Instructions Return in about 6 weeks (around 5/4/2017). Introducing [facility]! [facility] introduces Fonmatch patient portal. Now you can access parts of your medical record, email your doctor's office, and request medication refills online. 1. In your internet browser, go to [email or web address] 2. Click on the First Time User? Click Here link in the Sign In box. You will see the New Member Sign Up page. 3. Enter your Fonmatch Access Code exactly as it appears below. You will not need to use this code after youve completed the sign-up process. If you do not sign up before the expiration date, you must request a new code. · Fonmatch Access Code: [email or web address] Expires: 5/24/2017  4:16 PM 
 
4. Enter the last four digits of your Social Security Number (xxxx) and Date of Birth (mm/dd/yyyy) as indicated and click Submit. You will be taken to the next sign-up page. 5. Create a modulR ID. This will be your modulR login ID and cannot be changed, so think of one that is secure and easy to remember. 6. Create a modulR password. You can change your password at any time. 7. Enter your Password Reset Question and Answer. This can be used at a later time if you forget your password. 8. Enter your e-mail address. You will receive e-mail notification when new information is available in [address] 9. Click Sign Up. You can now view and download portions of your medical record. 10. Click the Download Summary menu link to download a portable copy of your medical information. If you have questions, please visit the Frequently Asked Questions section of the modulR website. Remember, modulR is NOT to be used for urgent needs. For medical emergencies, dial 911. Now available from your iPhone and Android! Please provide this summary of care documentation to your next provider. Your primary care clinician is listed as ALFREDO LANDRY. If you have any questions after today's visit, please call [phone].

## 2017-03-23 NOTE — PROGRESS NOTES
Chief Complaint   Patient presents with    Anticoagulation     Coumadin 5mg daily     1. Have you been to the ER, urgent care clinic since your last visit? Hospitalized since your last visit? No    2. Have you seen or consulted any other health care providers outside of the [address] [place] [address] since your last visit? Include any pap smears or colon screening. No      Results for orders placed or performed in visit on 03/23/17   AMB POC PT/INR   Result Value Ref Range    VALID INTERNAL CONTROL POC Yes     Prothrombin time (POC) 24.5 sec    INR POC 2.0      Ulcers on buttocks and come and go    Chief Complaint   Patient presents with    Anticoagulation     Coumadin 5mg daily    Decubitus Ulcer     Sore Located on Buttock     he is a 64y.o. year old male who presents for evalution. Reviewed PmHx, RxHx, FmHx, SocHx, AllgHx and updated and dated in the chart.     Patient Active Problem List    Diagnosis    Advance care planning     Has LW      DVT (deep venous thrombosis) (HCC)    AF (atrial fibrillation) (HCC)    GI bleed    Leucocytosis    Abdominal pain    Cellulitis of leg, right    TIA (transient ischemic attack)    Other peripheral vascular disease(443.89)    Renal failure    UTI (urinary tract infection)    Urinary retention    Parkinson disease ([facility] [address]    Weakness of left leg    Hyperkalemia    Anemia    Insomnia    Hypercholesteremia    HTN (hypertension)    PVD (peripheral vascular disease) (HCC)    Carotid stenosis    GERD (gastroesophageal reflux disease)    CRI (chronic renal insufficiency)       Review of Systems - negative except as listed above in the HPI    Objective:     Vitals:    03/23/17 1440   BP: 165/60   Pulse: (!) 52   Resp: 16   Temp: 98.4 °F (36.9 °C)   TempSrc: Oral   SpO2: 98%   Weight: 150 lb (68 kg)   Height: 5' 6\" (1.676 m)     Physical Examination: General appearance - alert, well appearing, and in no distress  Skin - stage one ducub ulcer on mid buttocks, no dc, mild red      Assessment/ Plan:   Alisa Lpoez was seen today for anticoagulation and decubitus ulcer. Diagnoses and all orders for this visit:    Decubitus ulcer, buttock, unspecified laterality, stage I  -refer to [facility] for wound care  -cushion to area    Chronic atrial fibrillation (HCC)  -     AMB POC PT/INR  -at goal    Essential hypertension  -inc but ok for pt       Follow-up Disposition:  Return in about 6 weeks (around 5/4/2017). I have discussed the diagnosis with the patient and the intended plan as seen in the above orders. The patient understands and agrees with the plan. The patient has received an after-visit summary and questions were answered concerning future plans. Medication Side Effects and Warnings were discussed with patient  Patient Labs were reviewed and or requested:  Patient Past Records were reviewed and or requested    Dwain Hernandez M.D. There are no Patient Instructions on file for this visit.

## 2017-03-24 NOTE — PROGRESS NOTES
Patient seen by Dr Erica Mixon, 3/23/17, need for further [place] assistance. Decubitus ulcer, buttock, unspecified laterality, stage I  -refer to [facility] for wound care  -cushion to area  3/24/17, This writer/NN received message from [employer], Herrera Theodore, [address], she is one of the Intake here called your office today to inform you all that patient is currently open to [facility] [address] confirmed with Krystian Salcedo agent of that agency. If you have any question please call me at [phone].   3/24/17, This writer/NN contacted [facility], [phone], spoke to Jerrica Meier to discuss above office visit and need for further Skilled Nursing/Wound Care. Jerrica Meier states patient continues to receive [place] services, aware of redness, however unaware of skin breakdown. Jerrica Meier agrees to notify team and schedule nurse visit for eval/treat of wound. Jerrica Meier agrees to keep IFP/NN contact information to call with further updates, questions or concerns.

## 2017-03-24 NOTE — Clinical Note
Marianne Carias, CHARIS---Looks like HH began after ED visit in Jan---[facility]  Thanks, Deana Mancera

## 2017-03-28 NOTE — PROGRESS NOTES
Roh[address] order was put on [facility]'s desk to process

## 2017-04-28 NOTE — PROGRESS NOTES
4/28/17, Patient listed on Daily Census/MSSP Report with alert of [facility]/Hospitalization. This writer/NN and [facility] access, able to confirm, patient admitted to [facility] related to Left leg pain/numbness(history of PVD with multiple bypasses and stents, followed by Dr Eliecer Cabrera). Printed H&P, Arteriogram/Angioplasty Report and recent progress note for Dr Antwon Hunt to review, as patient seen, 3/23/17. 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 Left lower extremity cold to touch(mid calf to foot), decreased motor and sensory to left foot. Right Groin with Lysis Catheter in place/IR assisting. Will continue to monitor for discharge and remain available for further transitions of care.

## 2017-05-02 NOTE — PROGRESS NOTES
5/2/17, This writer/NN and [facility] access, able to confirm, patient remains inpatient at this time, admitted 4/27/17 at [facility], has required Left Above Knee Amputation on 4/28/17. Printed Operative Report and recent progress note for Dr Arlen Mariee to review, as patient seen 3/23/17.  5/2/17, This writer/NN contacted [facility], [phone], spoke to [address] [place] [address] to discuss above hospitalization. [address] [place] [address] agrees to notify the team and keep IFP/NN contact information to call with further questions or concerns. PLAN: Will continue to monitor for discharge and assist with transitions of care as needed, possible discharge to [facility] [place]/SNF/REHAB and long term plan/? Assisted Living. Wife is primary caregiver, also cares for adult daughter in the home. See Previous NN/Patient Outreach Documentation:  4/28/17, Patient listed on Daily Census/MSSP Report with alert of [facility]/Hospitalization. This writer/NN and [facility] access, able to confirm, patient admitted to [facility] related to Left leg pain/numbness(history of PVD with multiple bypasses and stents, followed by Dr Lacey Norwood). Printed H&P, Arteriogram/Angioplasty Report and recent progress note for Dr Arlen Mariee to review, as patient seen, 3/23/17. 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 Left lower extremity cold to touch(mid calf to foot), decreased motor and sensory to left foot. Right Groin with Lysis Catheter in place/IR assisting. Will continue to monitor for discharge and remain available for further transitions of care.

## 2017-05-04 NOTE — PROGRESS NOTES
[address] certification & POC were completed and faxed to [phone]. Confirmed , scanned.

## 2017-05-10 NOTE — PROGRESS NOTES
5/10/17, This writer/NN and [facility] access, able to confirm, S/P 4/27/17 - 05/05/17 at [facility], related to Left Above Knee Amputation on 4/28/17, discharged to [address] [facility]. Printed Discharge Summary for Dr Mojica to review. 5/10/17, This writer/NN contacted [address] [facility], [phone], able to confirm patient remains at facility, however, , Acosta Torres, not available at this time, left message with IFP/NN contact information and request for return call. See Previous NN/Patient Outreach documentation:  PLAN: Will continue to monitor for discharge and assist with transitions of care as needed, ?long term plan/? Assisted Living. Wife is primary caregiver, also cares for adult daughter in the home. See Previous NN/Patient Outreach Documentation:  4/28/17, Patient listed on Daily Census/MSSP Report with alert of [facility]/Hospitalization. This writer/NN and [facility] access, able to confirm, patient admitted to [facility] related to Left leg pain/numbness(history of PVD with multiple bypasses and stents, followed by Dr Marvin Moncada). Printed H&P, Arteriogram/Angioplasty Report and recent progress note for Dr Mojica to review, as patient seen, 3/23/17. 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 Left lower extremity cold to touch(mid calf to foot), decreased motor and sensory to left foot. Right Groin with Lysis Catheter in place/IR assisting.

## 2017-05-24 NOTE — Clinical Note
Dr Jon Berg, CHARIS---S/P 4/27/17 - 05/05/17 at [facility], related to Left Above Knee Amputation on 4/28/17, discharged to [facility]. 5/24/17, This writer/NN contacted [facility], [phone], spoke to Isabel, able to confirm patient remains at facility in room 408, however, , Estee Luo, not available at this time, on hold and finally allowed to speak to Nurse, Massiel, states patient has been up most of the day, staples recently out, continues to participate in therapies and wife visits almost daily.  She is going to tell Mr Hari García that we called :) Thanks, Veronica Groves

## 2017-05-24 NOTE — PROGRESS NOTES
S/P 4/27/17 - 05/05/17 at [facility], related to Left Above Knee Amputation on 4/28/17, discharged to [address] [facility]. 5/24/17, This writer/NN contacted [address] [facility], [phone], spoke to Edis Ku, able to confirm patient remains at facility in room 408, however, , Kwasi Officer, not available at this time, on hold and finally allowed to speak to Nurse, Massiel, states patient has been up most of the day, staples recently out, continues to participate in therapies and wife visits almost daily. Massiel agrees to provide IFP/NN contact information to have Kwasi Officer return call with further discharge plans, updates or concerns. PLAN: Will continue to monitor for discharge and assist with transitions of care as needed, ?long term plan/? Assisted Living. Wife is primary caregiver, also cares for adult daughter in the home. See Previous NN/Patient Outreach Documentation:  4/28/17, Patient listed on Daily Census/MSSP Report with alert of [facility]/Hospitalization. This writer/NN and [facility] access, able to confirm, patient admitted to [facility] related to Left leg pain/numbness(history of PVD with multiple bypasses and stents, followed by Dr Brea Tian). Printed H&P, Arteriogram/Angioplasty Report and recent progress note for Dr Malik Cote to review, as patient seen, 3/23/17. 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 Left lower extremity cold to touch(mid calf to foot), decreased motor and sensory to left foot. Right Groin with Lysis Catheter in place/IR assisting.

## 2017-05-31 NOTE — PROGRESS NOTES
5/31/17, This writer/NN contacted [address] [facility], [phone], spoke to Melissa Bullock, able to confirm patient remains at facility, agrees to leave message for Social Work/Janell, with IFP/NN contact information and request for return call. PLAN: Will continue to monitor for discharge and assist with transitions of care as needed, ?long term plan/? Assisted Living. Wife is primary caregiver, also cares for adult daughter in the home. See Previous NN/Patient Outreach Documentation:  S/P 4/27/17 - 05/05/17 at [facility], related to Left Above Knee Amputation on 4/28/17, discharged to [id number] [place] [address]. 5/24/17, This writer/NN contacted [id number] [facility], [phone], spoke to Isabel, able to confirm patient remains at facility in room 408, however, , Danni Bates, not available at this time, on hold and finally allowed to speak to Nurse, Massiel, states patient has been up most of the day, staples recently out, continues to participate in therapies and wife visits almost daily. Massiel agrees to provide IFP/NN contact information to have Danni Bates return call with further discharge plans, updates or concerns. See Previous NN/Patient Outreach Documentation:  4/28/17, Patient listed on Daily Census/MSSP Report with alert of [facility]/Hospitalization. This writer/NN and [facility] access, able to confirm, patient admitted to [facility] related to Left leg pain/numbness(history of PVD with multiple bypasses and stents, followed by Dr Dylan Payne). Printed H&P, Arteriogram/Angioplasty Report and recent progress note for Dr Savi Wheeler to review, as patient seen, 3/23/17. 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 Left lower extremity cold to touch(mid calf to foot), decreased motor and sensory to left foot.  Right Groin with Lysis Catheter in place/IR assisting

## 2017-06-06 NOTE — PROGRESS NOTES
S/P 4/27/17 - 05/05/17 at [facility], related to Left Above Knee Amputation on 4/28/17, discharged to [address] [place] [address]. 6/6/17, This writer/NN contacted [address] [place] [address], [phone], spoke to Amparo, able to confirm patient remains at facility, agrees to leave message for Social Work/Janell, with IFP/NN contact information and request for return call. PLAN: Will continue to monitor for discharge and assist with transitions of care as needed, ?long term plan/? Assisted Living. Wife is primary caregiver, also cares for adult daughter in the home. See Previous NN/Patient Outreach Documentation:  5/24/17, This writer/NN contacted [address] [place] [address], [phone], spoke to Isabel, able to confirm patient remains at facility in room 408, however, , Gonzales Norwood, not available at this time, on hold and finally allowed to speak to Nurse, Massiel, states patient has been up most of the day, staples recently out, continues to participate in therapies and wife visits almost daily. Massiel agrees to provide IFP/NN contact information to have Gonzales Norwood return call with further discharge plans, updates or concerns. See Previous NN/Patient Outreach Documentation:  4/28/17, Patient listed on Daily Census/MSSP Report with alert of H[facility]/Hospitalization. This writer/NN and [facility] access, able to confirm, patient admitted to [facility] related to Left leg pain/numbness(history of PVD with multiple bypasses and stents, followed by Dr Teresa Lynn). Printed H&P, Arteriogram/Angioplasty Report and recent progress note for Dr Domenica Jain to review, as patient seen, 3/23/17. 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 Left lower extremity cold to touch(mid calf to foot), decreased motor and sensory to left foot.  Right Groin with Lysis Catheter in place/IR assisting

## 2017-06-13 NOTE — PROGRESS NOTES
S/P 4/27/17 - 05/05/17 at [facility], related to Left Above Knee Amputation on 4/28/17, discharged to [address] [place] [address]. 6/13/17, This writer/NN contacted [address] [place] [address], [phone], spoke to Sandee Nyhan, able to confirm patient remains at facility, agrees to leave message for Social Work/Janell, with IFP/NN contact information and request for return call. PLAN: Will continue to monitor for discharge and assist with transitions of care as needed, ?long term plan/? Assisted Living. Wife is primary caregiver, also cares for adult daughter in the home. See Previous NN/Patient Outreach Documentation:  5/24/17, This writer/NN contacted [address] [place] [address], [phone], spoke to Sandee Nyhan, able to confirm patient remains at facility in room 408, however, , Estee Luo, not available at this time, on hold and finally allowed to speak to Nurse, Massiel, states patient has been up most of the day, staples recently out, continues to participate in therapies and wife visits almost daily. Massiel agrees to provide IFP/NN contact information to have Estee Payer return call with further discharge plans, updates or concerns. See Previous NN/Patient Outreach Documentation:  4/28/17, Patient listed on Daily Census/MSSP Report with alert of H[facility]/Hospitalization. This writer/NN and [facility] access, able to confirm, patient admitted to [facility] related to Left leg pain/numbness(history of PVD with multiple bypasses and stents, followed by Dr Sj Queen). Printed H&P, Arteriogram/Angioplasty Report and recent progress note for Dr Jon Berg to review, as patient seen, 3/23/17. 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 Left lower extremity cold to touch(mid calf to foot), decreased motor and sensory to left foot.  Right Groin with Lysis Catheter in place/IR assisting

## 2017-06-15 NOTE — PROGRESS NOTES
Patient currently at [facility]. S/P 4/27/17 - 05/05/17 at [facility], related to Left Above Knee Amputation on 4/28/17, discharged to [facility]. 6/15/17, This writer/NN contacted [facility], [phone], spoke to Isabel, able to confirm patient remains at facility, spoke to Nurse, Jazz Griffin, states no mention of discharge at this time, agrees to leave message for , Angeles Siddiqui, with IFP/NN Contact information and request for return call. PLAN: Will continue to monitor for discharge and assist with transitions of care as needed, ?long term plan/? Assisted Living. Wife is primary caregiver, also cares for adult daughter in the home. See Previous NN/Patient Outreach Documentation:  5/24/17, This writer/NN contacted [facility], [phone], spoke to Isabel, able to confirm patient remains at facility in room 408, however, , Angeles Siddiqui, not available at this time, on hold and finally allowed to speak to Nurse, Massiel, states patient has been up most of the day, staples recently out, continues to participate in therapies and wife visits almost daily. Massiel agrees to provide IFP/NN contact information to have [facility] return call with further discharge plans, updates or concerns. See Previous NN/Patient Outreach Documentation:  4/28/17, Patient listed on Daily Census/MSSP Report with alert of HCA/Hospitalization. This writer/NN and HCA access, able to confirm, patient admitted to [facility] related to Left leg pain/numbness(history of PVD with multiple bypasses and stents, followed by Dr Lacey Norwood). Printed H&P, Arteriogram/Angioplasty Report and recent progress note for Dr Arlen Mariee to review, as patient seen, 3/23/17. 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 Left lower extremity cold to touch(mid calf to foot), decreased motor and sensory to left foot.  Right Groin with Lysis Catheter in place/IR assisting

## 2017-06-22 NOTE — PROGRESS NOTES
6/22/17, This writer/NN contacted [facility], spoke to states Isabel patient remains at facility at this time. PLAN: Will continue to monitor for discharge and assist with transitions of care as needed, ?long term plan/? Assisted Living. Wife is primary caregiver, also cares for adult daughter in the home. See Previous NN/Patient Outreach Documentation:  Patient currently at [address] [place] [address]. S/P 4/27/17 - 05/05/17 at [facility], related to Left Above Knee Amputation on 4/28/17, discharged to [address] [place] [address]. 6/15/17, This writer/NN contacted [address] [place] [address], [phone], spoke to Isabel, able to confirm patient remains at facility, spoke to NurseSedrick, states no mention of discharge at this time, agrees to leave message for Esmer, with IFP/NN Contact information and request for return call. See Previous NN/Patient Outreach Documentation:  5/24/17, This writer/NN contacted [address] [place] [address], [phone], spoke to Isabel, able to confirm patient remains at facility in room 408, however, Esmer, not available at this time, on hold and finally allowed to speak to NurseMassiel,  patient has been up most of the day, staples recently out, continues to participate in therapies and wife visits almost daily. Massiel agrees to provide IFP/NN contact information to have Glen Rogers return call with further discharge plans, updates or concerns. See Previous NN/Patient Outreach Documentation:  4/28/17, Patient listed on Daily Census/MSSP Report with alert of HCA/Hospitalization. This writer/NN and HCA access, able to confirm, patient admitted to [facility] related to Left leg pain/numbness(history of PVD with multiple bypasses and stents, followed by Dr Jasper Rosario). Printed H&P, Arteriogram/Angioplasty Report and recent progress note for Dr Baylee Cagle to review, as patient seen, 3/23/17.    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 Left lower extremity cold to touch(mid calf to foot), decreased motor and sensory to left foot.  Right Groin with Lysis Catheter in place/IR assisting

## 2017-06-27 NOTE — PROGRESS NOTES
Patient seen at [facility] ED, 6/27/17 related to Right Great Toe Injury. Patient S/P 4/27/17 - 05/05/17 at [facility], related to Left Above Knee Amputation on 4/28/17, discharged to [address] [place] [address], remains at facility, 6/22/17. Printed ED Provider Note for Dr Tanja Dale to review. Per Documentation, Compression placed, Surgifoam placed over skin tear around nail bed. Hold COUMADIN Dose today, 6/27/17.  6/27/17, This writer/NN contacted [address] [place] [address], [phone], able to confirm patient has returned to facility, nurse today, 6/27/17 will be Massiel, however, not available for call at this time, will provide message with IFP/NN contact information, above recommendation to hold Coumadin dose today, 6/27/17, and call with further questions or concerns. PLAN: Will continue to monitor for discharge and assist with transitions of care as needed, ?long term plan/? Assisted Living. Wife is primary caregiver, also cares for adult daughter in the home  See Previous NN/Patient Outreach Documentation:  Patient currently at [address] [place] [address]. S/P 4/27/17 - 05/05/17 at [facility], related to Left Above Knee Amputation on 4/28/17, discharged to [address] [place] [address]. 6/15/17, This writer/NN contacted [address] [place] [address], [phone], spoke to Ginger Valenzuela, able to confirm patient remains at facility, spoke to NurseGovernor Packer, states no mention of discharge at this time, agrees to leave message for LTAC, [facility], with IFP/NN Contact information and request for return call.    See Previous NN/Patient Outreach Documentation:  5/24/17, This writer/NN contacted [address] [place] [address], [phone], spoke to Ginger Valenzuela, able to confirm patient remains at facility in room 408, however, , [facility], not available at this time, on hold and finally allowed to speak to Nurse, Massiel, states patient has been up most of the day, staples recently out, continues to participate in therapies and wife visits almost daily. Massiel agrees to provide IFP/NN contact information to have [facility] return call with further discharge plans, updates or concerns. See Previous NN/Patient Outreach Documentation:  4/28/17, Patient listed on Daily Census/MSSP Report with alert of HCA/Hospitalization. This writer/NN and HCA access, able to confirm, patient admitted to [facility] related to Left leg pain/numbness(history of PVD with multiple bypasses and stents, followed by Dr Suzi Gerard). Printed H&P, Arteriogram/Angioplasty Report and recent progress note for  [facility] to review, as patient seen, 3/23/17. 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 Left lower extremity cold to touch(mid calf to foot), decreased motor and sensory to left foot.  Right Groin with Lysis Catheter in place/IR assisting

## 2017-08-01 NOTE — PROGRESS NOTES
8/1/17, This writer/NN contacted [address] [place] [address], [phone], spoke to Vahe Yanez, able to confirm patient remains at facility at this time. PLAN: Will continue to monitor for discharge and assist with transitions of care as needed, ?long term plan/? Assisted Living. Wife is primary caregiver, also cares for adult daughter in the home  See Previous NN/Patient Outreach Documentation:  Patient seen at [facility] ED, 6/27/17 related to Right Great Toe Injury. Patient S/P 4/27/17 - 05/05/17 at [facility], related to Left Above Knee Amputation on 4/28/17, discharged to [address] [place] [address], remains at facility, 6/22/17. Printed ED Provider Note for Dr Vaibhav Ku to review. Per Documentation, Compression placed, Surgifoam placed over skin tear around nail bed. Hold COUMADIN Dose today, 6/27/17.  6/27/17, This writer/NN contacted [address] [place] [address], [phone], able to confirm patient has returned to facility, nurse today, 6/27/17 will be Massiel, however, not available for call at this time, will provide message with IFP/NN contact information, above recommendation to hold Coumadin dose today, 6/27/17, and call with further questions or concerns. See Previous NN/Patient Outreach Documentation:  Patient currently at [address] [place] [address]. S/P 4/27/17 - 05/05/17 at [facility], related to Left Above Knee Amputation on 4/28/17, discharged to [address] [place] [address]. 6/15/17, This writer/NN contacted [address] [place] [address], [phone], spoke to Greer Corado, able to confirm patient remains at facility, spoke to Nurse, Carlos Gibbons, states no mention of discharge at this time, agrees to leave message for , Katie Reyes, with IFP/NN Contact information and request for return call.    See Previous NN/Patient Outreach Documentation:  5/24/17, This writer/NN contacted [facility], [phone], spoke to Greer Corado, able to confirm patient remains at facility in room 408, however, , Katie Reyes, not available at this time, on hold and finally allowed to speak to Nurse, Massiel, states patient has been up most of the day, staples recently out, continues to participate in therapies and wife visits almost daily. Massiel agrees to provide IFP/NN contact information to have Diana Found return call with further discharge plans, updates or concerns. See Previous NN/Patient Outreach Documentation:  4/28/17, Patient listed on Daily Census/MSSP Report with alert of HCA/Hospitalization. This writer/NN and HCA access, able to confirm, patient admitted to [facility] related to Left leg pain/numbness(history of PVD with multiple bypasses and stents, followed by Dr Ariella Ramos). Printed H&P, Arteriogram/Angioplasty Report and recent progress note for Dr Oksana Puga to review, as patient seen, 3/23/17. 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 Left lower extremity cold to touch(mid calf to foot), decreased motor and sensory to left foot.  Right Groin with Lysis Catheter in place/IR assisting

## 2017-08-03 NOTE — Clinical Note
CHARIS Lewis---Per Vahid Donnelly at [facility], Patient will likely DC to [facility], possibly next week. Care plan meeting pending. I will keep you posted.  Thanks, Shaka Dobbs

## 2017-08-03 NOTE — PROGRESS NOTES
S/P [facility], 6/27/17 related to Right Great Toe Injury. Patient S/P 4/27/17 - 05/05/17 at [facility], related to Left Above Knee Amputation on 4/28/17, discharged to [address] [place] [address], remains at facility, 6/22/17.  8/3/17, Care Coordination Documentation: This writer/NN received message from CCT/Margaret ANDRADE, states:  Per Patricia Goodson at [facility], Patient will likely DC to [facility] long-term, possibly next week. Care plan meeting pending. PLAN: Will continue to monitor for discharge and assist with transitions of care as needed, ?long term plan/? Assisted Living. Wife is primary caregiver, also cares for adult daughter in the home  See Previous NN/Patient Outreach Documentation:  8/1/17, This writer/NN contacted [facility], [phone], spoke to Maxx Rai, able to confirm patient remains at facility at this time. See Previous NN/Patient Outreach Documentation:  Patient seen at [facility] ED, 6/27/17 related to Right Great Toe Injury. Patient S/P 4/27/17 - 05/05/17 at [facility], related to Left Above Knee Amputation on 4/28/17, discharged to [address] [place] [address], remains at facility, 6/22/17. Printed ED Provider Note for Dr Lorena Bardales to review. Per Documentation, Compression placed, Surgifoam placed over skin tear around nail bed. Hold COUMADIN Dose today, 6/27/17.  6/27/17, This writer/NN contacted [address] [place] [address], [phone], able to confirm patient has returned to facility, nurse today, 6/27/17 will be Massiel, however, not available for call at this time, will provide message with IFP/NN contact information, above recommendation to hold Coumadin dose today, 6/27/17, and call with further questions or concerns.    See Previous NN/Patient Outreach Documentation:  Patient currently at [address] [place] [address]. S/P 4/27/17 - 05/05/17 at [facility], related to Left Above Knee Amputation on 4/28/17, discharged to [address].   6/15/17, This writer/NN contacted [facility], [phone], spoke to [employer] , able to confirm patient remains at facility, spoke to Nurse, Abilio Salguero, states no mention of discharge at this time, agrees to leave message for , Casimir Simmonds, with IFP/NN Contact information and request for return call. See Previous NN/Patient Outreach Documentation:  5/24/17, This writer/NN contacted [facility], [phone], spoke to [employer] , able to confirm patient remains at facility in room 408, however, , Casimir Simmonds, not available at this time, on hold and finally allowed to speak to Nurse, Massiel, states patient has been up most of the day, staples recently out, continues to participate in therapies and wife visits almost daily. Massiel agrees to provide IFP/NN contact information to have Casimir Simmonds return call with further discharge plans, updates or concerns. See Previous NN/Patient Outreach Documentation:  4/28/17, Patient listed on Daily Census/MSSP Report with alert of HCA/Hospitalization. This writer/NN and HCA access, able to confirm, patient admitted to [facility] related to Left leg pain/numbness(history of PVD with multiple bypasses and stents, followed by Dr Patricia Justice). Printed H&P, Arteriogram/Angioplasty Report and recent progress note for Dr Felipe Dawn to review, as patient seen, 3/23/17. 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 Left lower extremity cold to touch(mid calf to foot), decreased motor and sensory to left foot.  Right Groin with Lysis Catheter in place/IR assisting

## 2017-08-03 NOTE — PROGRESS NOTES
Community Care Team Documentation for Patient in [facility]  Initial Follow Up       Patient was admitted to:  S/P 4/27/17 - 05/05/17 at [facility], related to Left Above Knee Amputation on 4/28/17, discharged to [address] [facility]. (Per Gino Cabrera's note on 8/1/17. See other documentation from LUPE Rosales at [facility]). Community Care Team will follow for disposition and provide info to Nurse Navigator Karma Waldron. PCP : Esa Andersen MD  Nurse Navigator in PCP office: Karma Waldron  Note routed to Nurse Navigator team.    PCP follow up appointment:  Not yet scheduled    Per Dylan Oh at [facility], Patient will likely DC to [facility], possibly next week. Care plan meeting pending. Anticipated discharge date from SNF:  Undetermined    SNF discharge plan:  DC to [facility]    Medications were not reconciled and general patient assessment was not completed during this skilled nursing facility outreach.      Demarcus Og, MSN, RN, ACNS-BC, [facility]  Nurse Navigator, CBA [employer] [phone]

## 2017-08-10 NOTE — Clinical Note
Estephania Vang made f/u PCP appt. Please check to be sure it is ok.   I am sending it to SNF today to include in his DC instructions

## 2017-08-10 NOTE — PROGRESS NOTES
Community Care Team Documentation for Patient in [facility]  Subsequent Follow up     Patient remains at [facility] ([facility]). See previous [facility] Team notes. PCP : Isac Oh MD  Nurse Navigator in PCP office: Shirley Nath    PCP follow up appointment:  Not yet scheduled    Per Chely Dumont at [facility], Plan for DC to [facility] on 8/11 with [facility]. Rehab to be provided through [facility]. Patient to receive wound care 3 times per week. PCP follow up 8/14 at 2:30 PM with Dr. Grazyna Hadley. Anticipated discharge date from SNF:  8/11/17    SNF discharge plan:  DC to [facility] with [facility], and rehab through [facility]. Medications were not reconciled and general patient assessment was not completed during this skilled nursing facility outreach.      Sharion Callas, MSN, RN, ACNS-BC, [facility]  Nurse Navigator, [employer] [phone]

## 2017-08-10 NOTE — TELEPHONE ENCOUNTER
At [facility] [facility] calling to verify patients last office visit. Caller is requesting orders for PT/OT & nursing.  Best contact # is [phone]

## 2017-08-10 NOTE — TELEPHONE ENCOUNTER
Lauryn Zapata at the number proved and spoke with her about the pt, and advised the pt to make appointment and see Dr. Ya Mcbride about PT/OT, Jose Armando Ramos verbalized understanding.

## 2017-08-11 NOTE — Clinical Note
Dr Kenan Sommers, Patient at [address] of [facility], 5/5/17 - 8/11/17,  S/P 4/27/17 - 05/05/17 at [facility], related to Left Above Knee Amputation on 4/28/17, dishcarged to [facility], has scheduled F/U appt with Dr Kenan Sommers, 8/14/17. I am not sure he will be able to have transportation to get him to this appt. I am waiting on wife to call me back. I will keep you posted.  Thanks, Farzana Dent

## 2017-08-11 NOTE — Clinical Note
Dr Grazyna Hadley, Vasiliy Ramirez--- appt has been changed to 8/17/17 at 2:10pm---I am still trying to confirm [facility] agency and transportation--- Patient at [facility], 5/5/17 - 8/11/17, S/P 4/27/17 - 05/05/17 at [facility], related to Left Above Knee Amputation on 4/28/17, dishcarged to [facility], has scheduled F/U appt with Dr Grazyna Hadley, 8/14/17. I will keep you posted.  Thanks, Olimpia Art

## 2017-08-11 NOTE — PROGRESS NOTES
S/P [facility] ED, 6/27/17 related to Right Great Toe Injury. Patient S/P 4/27/17 - 05/05/17 at [facility], related to Left Above Knee Amputation on 4/28/17, discharged to [address] [facility], remains at facility, 6/22/17.  8/11/17, Care Coordination Documentation: This writer/NN received message from CCT/LUPE, Margaret, :  Per Ettie Essex at [facility], Plan for DC to [facility] on 8/11 with At [facility]. Rehab to be provided through [facility]. Patient to receive wound care 3 times per week. PCP follow up 8/14 at 2:30 PM with Dr. Essence Shafer  Wife is primary caregiver, also cares for adult daughter in the home  8/11/17, This writer/NN contacted Melissa, [phone], patient just arriving, Nurse Corrie Senior states not aware of above appt, \[facility] [place] [facility]\"" usually cannot assist with transportation after 1pm, states wife coming in to see patient and will discuss further with her and ask that she return call. 8/11/17, This writer/NN contacted At [facility], [phone], spoke to Valentin Dean, states has not received intake information, \[facility] [place] [facility]\"" Liaison is Belkys Multani, will reach out and request return call. 8/11/17, This writer/NN contacted [address] [facility], [phone], spoke to Isabel,  [place] unavailable at this time, agrees to provide message to discuss F/U appt, [facility] agency and request for return call. 8/11/17, Per chart review, appt has been changed to 8/17/17 at 2:10pm  PLAN: Assist with Transitions of Care. Discuss discharge instructions, medications(compliance and reconciliation), HH progress and F/U appts. Provide instruction, goal work and resource connection as indicated. See Previous NN/Patient Outreach Documentation:  8/1/17, This writer/NN contacted [facility], [phone], spoke to Beto Garcia, able to confirm patient remains at facility at this time. See Previous NN/Patient Outreach Documentation:  Patient seen at [facility] ED, 6/27/17 related to Right Great Toe Injury. Patient S/P 4/27/17 - 05/05/17 at [facility], related to Left Above Knee Amputation on 4/28/17, discharged to [address] [place] [address], remains at facility, 6/22/17. Printed ED Provider Note for Dr Ila Viveros to review. Per Documentation, Compression placed, Surgifoam placed over skin tear around nail bed. Hold COUMADIN Dose today, 6/27/17.  6/27/17, This writer/NN contacted [address] [place] [address], [phone], able to confirm patient has returned to facility, nurse today, 6/27/17 will be Massiel, however, not available for call at this time, will provide message with IFP/NN contact information, above recommendation to hold Coumadin dose today, 6/27/17, and call with further questions or concerns.    See Previous NN/Patient Outreach Documentation:  Patient currently at [address] [place] [address]. S/P 4/27/17 - 05/05/17 at [facility], related to Left Above Knee Amputation on 4/28/17, discharged to [address] [place] [address]. 6/15/17, This writer/NN contacted [address] [place] [address], [phone], spoke to Leonidas Hines, able to confirm patient remains at facility, spoke to NurseNabila, states no mention of discharge at this time, agrees to leave message for , Juan Luis Bradley, with IFP/NN Contact information and request for return call. See Previous NN/Patient Outreach Documentation:  5/24/17, This writer/NN contacted [address] [place] [address], [phone], spoke to Leonidas Hines, able to confirm patient remains at facility in room 408, however, , Juan Luis Bradley, not available at this time, on hold and finally allowed to speak to Nurse, Massiel, states patient has been up most of the day, staples recently out, continues to participate in therapies and wife visits almost daily. Massiel agrees to provide IFP/NN contact information to have Arch Sinning return call with further discharge plans, updates or concerns.    See Previous NN/Patient Outreach Documentation:  4/28/17, Patient listed on Daily Census/MSSP Report with alert of [facility]/Hospitalization. This writer/NN and [facility] access, able to confirm, patient admitted to [facility] related to Left leg pain/numbness(history of PVD with multiple bypasses and stents, followed by Dr Carlos Mares). Printed H&P, Arteriogram/Angioplasty Report and recent progress note for Dr Ulises Winston to review, as patient seen, 3/23/17. 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 Left lower extremity cold to touch(mid calf to foot), decreased motor and sensory to left foot.  Right Groin with Lysis Catheter in place/IR assisting

## 2017-08-14 NOTE — PROGRESS NOTES
Patient at [facility], 5/5/17 - 8/11/17,  S/P 4/27/17 - 05/05/17 at [facility], related to Left Above Knee Amputation on 4/28/17, dishcarged to [facility], has scheduled F/U appt with Dr Ila Viveros, 8/14/17, however, patient has required further hospitalization, admitted to [facility], 8/12/17, related to fall. Printed H&P for Dr Ila Viveros to review, per chart review, F/U appt has been changed to 8/17/17 at 2:10pm  8/14/17, This writer/NN contacted At [facility], [phone], to discuss above hospitalization.  Enter understanding and agrees to have Liaison reach out to [facility]. Gayle Encinas agrees to keep IFP/NN contact information to call with further updates, questions or concerns. PLAN: Assist with Transitions of Care. Discuss discharge instructions, medications(compliance and reconciliation), HH progress and F/U appts. Provide instruction, goal work and resource connection as indicated. See Previous NN/Patient Outreach Documentation:  S/P [facility] ED, 6/27/17 related to Right Great Toe Injury. Patient S/P 4/27/17 - 05/05/17 at [facility], related to Left Above Knee Amputation on 4/28/17, discharged to [address] [place] [address](5/5/17 - 8/11/17).  8/11/17, Care Coordination Documentation: This writer/NN received message from CCT/Margaret ANDRADE, states:  Per [facility], Plan for DC to [facility] on 8/11 with At [address] to be provided through [address] to receive wound care 3 times per week.  PCP follow up 8/14 at 2:30 PM with Dr. Ila Viveros  Wife is primary caregiver, also cares for adult daughter in the home  8/11/17, This writer/NN contacted Melissa, [phone], patient just arriving, Nurse Kendra Rose states not aware of above appt, states usually cannot assist with transportation after 1pm, states wife coming in to see patient and will discuss further with her and ask that she return call.   8/11/17, This writer/NN contacted At [facility], [phone], spoke to Meda Spatz, states has not received intake information, states Liaison is Estrada Manjarrez, will reach out and request return call. 8/11/17, This writer/NN contacted [address] [place] [address], [phone], spoke to Isabel, [facility] Luis Villatoro unavailable at this time, agrees to provide message to discuss F/U appt, [place] agency and request for return call. 8/11/17, Per chart review, appt has been changed to 8/17/17 at 2:10pm  See Previous NN/Patient Outreach Documentation:  8/1/17, This writer/NN contacted [facility], [phone], spoke to Alma Stovall, able to confirm patient remains at facility at this time. See Previous NN/Patient Outreach Documentation:  Patient seen at [facility] ED, 6/27/17 related to Right Great Toe Injury. Patient S/P 4/27/17 - 05/05/17 at [facility], related to Left Above Knee Amputation on 4/28/17, discharged to [address] [place] [address], remains at facility, 6/22/17. Printed ED Provider Note for Dr Sergey Barbosa to review. Per Documentation, Compression placed, Surgifoam placed over skin tear around nail bed. Hold COUMADIN Dose today, 6/27/17.  6/27/17, This writer/NN contacted [address] [place] [address], [phone], able to confirm patient has returned to facility, nurse today, 6/27/17 will be Massiel, however, not available for call at this time, will provide message with IFP/NN contact information, above recommendation to hold Coumadin dose today, 6/27/17, and call with further questions or concerns.    See Previous NN/Patient Outreach Documentation:  Patient currently at [address] [place] [address]. S/P 4/27/17 - 05/05/17 at [facility], related to Left Above Knee Amputation on 4/28/17, discharged to [address] [place] [address].   6/15/17, This writer/NN contacted [address] [place] [address], [phone], spoke to Isabel, able to confirm patient remains at facility, spoke to Nurse, Felisa Quesada, states no mention of discharge at this time, agrees to leave message for , Luis Villatoro, with IFP/NN Contact information and request for return call. See Previous NN/Patient Outreach Documentation:  5/24/17, This writer/NN contacted [address] of [facility], [phone], spoke to Isabel, able to confirm patient remains at facility in room 408, however, , Mitchell Storey, not available at this time, on hold and finally allowed to speak to Nurse, Massiel, states patient has been up most of the day, staples recently out, continues to participate in therapies and wife visits almost daily. Massiel agrees to provide IFP/NN contact information to have Mitchell Storey return call with further discharge plans, updates or concerns. See Previous NN/Patient Outreach Documentation:  4/28/17, Patient listed on Daily Census/MSSP Report with alert of [facility]/Hospitalization. This writer/NN and [facility] access, able to confirm, patient admitted to [facility] related to Left leg pain/numbness(history of PVD with multiple bypasses and stents, followed by Dr Francina Severance). Printed H&P, Arteriogram/Angioplasty Report and recent progress note for Dr Grayson Rater to review, as patient seen, 3/23/17. 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 Left lower extremity cold to touch(mid calf to foot), decreased motor and sensory to left foot.  Right Groin with Lysis Catheter in place/IR assisting

## 2017-08-14 NOTE — Clinical Note
Dr Melinda Augustin, Already back in the hospital----Patient at [address] of [facility], 5/5/17 - 8/11/17,  S/P 4/27/17 - 05/05/17 at [facility], related to Left Above Knee Amputation on 4/28/17, [facility]arged to [place] [facility], has scheduled F/U appt with Dr Melinda Augustin, 8/14/17, however, patient has required further hospitalization, admitted to [facility], 8/12/17, related to fall.

## 2017-08-15 NOTE — PROGRESS NOTES
Patient at [address] [facility], 5/5/17 - 8/11/17,  S/P 4/27/17 - 05/05/17 at [facility], related to Left Above Knee Amputation on 4/28/17, dishcarged to [facility], has scheduled F/U appt with Dr Oksana Puga, 8/14/17, however, patient has required further hospitalization, [facility], 8/12/17-8/14/17, related to fall, returned to [facility]. 8/15/17, This writer/NN and [facility] access, able to confirm, above hospitalization, printed discharge summary for Dr Oksana Puga to review. 8/15/17, This writer/NN contacted [facility], [phone], spoke to Nursing Staff, Lakeisha Pal, states patient has returned to facility, agrees to have medication reconciliation faxed to Dr Oksana Puga, as patient has scheduled F/U appt 8/17/17, and agrees to keep IFP/NN contact information to call with further questions or concerns. Lakeisha Pal states HH has not started at this time. 8/15/17, This writer/NN contacted At [facility], spoke to Norma(will be out of [place] office, [phone]), states have not been notified of discharge. This writer/NN explained above hospitalization and outreach to [facility], patient currently there. Taras Echevarria understanding, agrees to notify  of discharge and keep IFP/NN contact information to call with further questions or concerns. PLAN: Assist with Transitions of Care. Discuss discharge instructions, medications(compliance and reconciliation), HH progress and F/U appts. Provide instruction, goal work and resource connection as indicated. See Previous NN/Patient Outreach Documentation:  F/U appt has been changed to 8/17/17 at 2:10pm  8/14/17, This writer/NN contacted At [facility], [phone], to discuss above hospitalization. Chung George understanding and agrees to have Liaison reach out to [facility]. Shannon Nazario agrees to keep IFP/NN contact information to call with further updates, questions or concerns.    See Previous NN/Patient Outreach Documentation:  S/P [facility], 6/27/17 related to Right Great Toe Injury. Patient S/P 4/27/17 - 05/05/17 at [facility], related to Left Above Knee Amputation on 4/28/17, discharged to [address] [place] [address](5/5/17 - 8/11/17).  8/11/17, Care Coordination Documentation: This writer/NN received message from CCT/NN, Margaret, states:  Per [facility], Plan for DC to [facility] on 8/11 with At [address] to be provided through [address] to receive wound care 3 times per week.  PCP follow up 8/14 at 2:30 PM with Dr. Marion Hawthorne  Wife is primary caregiver, also cares for adult daughter in the home  8/11/17, This writer/NN contacted Melissa, [phone], patient just arriving, Nurse Li Mallory states not aware of above appt, states usually cannot assist with transportation after 1pm, states wife coming in to see patient and will discuss further with her and ask that she return call. 8/11/17, This writer/NN contacted At [facility], [phone], spoke to Aparna Lugo, states has not received intake information, states Liaison is Shaka Clement, will reach out and request return call. 8/11/17, This writer/NN contacted [address] [facility], [phone], spoke to Isabel, states Kelli Gibbons unavailable at this time, agrees to provide message to discuss F/U appt, [facility] agency and request for return call. 8/11/17, Per chart review, appt has been changed to 8/17/17 at 2:10pm  See Previous NN/Patient Outreach Documentation:  8/1/17, This writer/NN contacted [facility], [phone], spoke to Lang Monsalve, able to confirm patient remains at facility at this time. See Previous NN/Patient Outreach Documentation:  Patient seen at [facility] ED, 6/27/17 related to Right Great Toe Injury. Patient S/P 4/27/17 - 05/05/17 at [facility], related to Left Above Knee Amputation on 4/28/17, discharged to [address] [facility], remains at facility, 6/22/17. Printed ED Provider Note for Dr Marion Hawthorne to review.  Per Documentation, Compression placed, Surgifoam placed over skin tear around nail bed. Hold COUMADIN Dose today, 6/27/17.  6/27/17, This writer/NN contacted [facility], [phone], able to confirm patient has returned to facility, nurse today, 6/27/17 will be Massiel, however, not available for call at this time, will provide message with IFP/NN contact information, above recommendation to hold Coumadin dose today, 6/27/17, and call with further questions or concerns.    See Previous NN/Patient Outreach Documentation:  Patient currently at [facility]. S/P 4/27/17 - 05/05/17 at [facility], related to Left Above Knee Amputation on 4/28/17, discharged to [facility]. 6/15/17, This writer/NN contacted [facility], [phone], spoke to Chiquita Bhatt, able to confirm patient remains at facility, spoke to Nurse, Catalino Glynn, states no mention of discharge at this time, agrees to leave message for , Georgia Laurent, with IFP/NN Contact information and request for return call. See Previous NN/Patient Outreach Documentation:  5/24/17, This writer/NN contacted [facility], [phone], spoke to Chiquita Bhatt, able to confirm patient remains at facility in room 408, however, , Georgia Laurent, not available at this time, on hold and finally allowed to speak to Nurse, Massiel, states patient has been up most of the day, staples recently out, continues to participate in therapies and wife visits almost daily. Massiel agrees to provide IFP/NN contact information to have Georgia Laurent return call with further discharge plans, updates or concerns. See Previous NN/Patient Outreach Documentation:  4/28/17, Patient listed on Daily Census/MSSP Report with alert of [facility]/Hospitalization. This writer/NN and [facility] access, able to confirm, patient admitted to [facility] related to Left leg pain/numbness(history of PVD with multiple bypasses and stents, followed by Dr Wende Castleman).  Printed H&P, Arteriogram/Angioplasty Report and recent progress note for Dr Melinda Augustin to review, as patient seen, 3/23/17. 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 Left lower extremity cold to touch(mid calf to foot), decreased motor and sensory to left foot.  Right Groin with Lysis Catheter in place/IR assisting

## 2017-08-16 NOTE — PROGRESS NOTES
Ms Allegra Campos from [address] called stating that on two BP reads Mr. Knox BP was 190/60. He has received 10mg of amlodipine this a.m. He is due for his second dosing of metoprolol 50mg this evening. He is responsive and acting normally. No complaints of chest pain. Instructed to give him his normal evening dose of metoprolol. If bp decreases he will continue with follow up with Dr. Parth Talley as scheduled. If no decrease, instructed that he will need to go to the ER for further BP management.

## 2017-08-17 PROBLEM — S78.119A UNILATERAL AKA (HCC): Status: ACTIVE | Noted: 2017-01-01

## 2017-08-17 NOTE — MR AVS SNAPSHOT
Visit Information Date & Time Provider Department Dept. Phone Encounter #  
 8/17/2017  2:10 PM Ida Ellsworth MD [facility] [phone] [id number] Follow-up Instructions Return if symptoms worsen or fail to improve. Upcoming Health Maintenance Date Due DTaP/Tdap/Td series (1 - Tdap) 9/19/1961 INFLUENZA AGE 9 TO ADULT 8/1/2017 MEDICARE YEARLY EXAM 11/22/2017 GLAUCOMA SCREENING Q2Y 1/26/2019 Allergies as of 8/17/2017  Review Complete On: 8/17/2017 By: Ida Ellsworth MD  
  
 Severity Noted Reaction Type Reactions Morphine  05/13/2010    Other (comments) Mental change  hallucinations Pcn [Penicillins]  05/12/2010    Hives Current Immunizations  Reviewed on 10/10/2016 Name Date Influenza High Dose Vaccine PF 10/10/2016 Influenza Vaccine 9/25/2013 Influenza Vaccine Alfonzo Dubonnet) 10/12/2015 Influenza Vaccine Split 10/23/2012, 11/7/2011, 11/3/2010 Pneumococcal Conjugate (PCV-13) 10/12/2015 ZZZ-RETIRED (DO NOT USE) Pneumococcal Vaccine (Unspecified Type) 8/4/2009 Zoster Vaccine, Live 9/25/2013 Not reviewed this visit You Were Diagnosed With   
  
 Codes Comments Coagulation deficiency ([facility]    -  Primary ICD-10-CM: D69.9 ICD-9-CM: 286.9 Unilateral AKA, left ([facility]     ICD-10-CM: V69.286 ICD-9-CM: V49.76 Essential hypertension     ICD-10-CM: I10 
ICD-9-CM: 401.9 CRI (chronic renal insufficiency), stage 3 (moderate)     ICD-10-CM: N18.3 ICD-9-CM: 796. 3 Parkinson disease ([facility]     ICD-10-CM: G20 
ICD-9-CM: 332.0 Vitals BP Pulse Temp Resp Weight(growth percentile) SpO2  
 126/53 60 98 °F (36.7 °C) 16 119 lb (54 kg) 97% BMI Smoking Status 19.21 kg/m2 Former Smoker Vitals History BMI and BSA Data Body Mass Index Body Surface Area  
 19.21 kg/m 2 1.59 m 2 [phone] Your Updated Medication List  
  
   
This list is accurate as of: 8/17/17  3:27 PM.  Always use your most recent med list.  
  
  
  
  
 ALPRAZolam 0.25 mg tablet Commonly known as:  XANAX  
TAKE 1 TABLET BY MOUTH TWICE A DAY AS NEEDED FOR ANXIETY  
  
 amiodarone 200 mg tablet Commonly known as:  CORDARONE  
TAKE 1 TABLET BY MOUTH DAILY  
  
 amLODIPine 5 mg tablet Commonly known as:  Cristina Jorje TAKE 1 TABLET BY MOUTH DAILY AZILECT 1 mg tablet Generic drug:  rasagiline Take 1 mg by mouth daily. TAKES AT NOON DAILY  
  
 carbidopa-levodopa  mg per tablet Commonly known as:  SINEMET  
TAKE 1 TABLET BY MOUTH THREE TIMES A DAY CENTRUM SILVER Tab tablet Generic drug:  multivitamins-minerals-lutein Take 1 Tab by mouth daily. clopidogrel 75 mg Tab Commonly known as:  PLAVIX Take 1 Tab by mouth daily. colestipol 1 gram tablet Commonly known as:  COLESTID  
TAKE 1 TABLET BY MOUTH TWICE A DAY  
  
 collagenase 250 unit/gram ointment Commonly known as:  SANTYL Apply  to affected area daily. digoxin 0.125 mg tablet Commonly known as:  LANOXIN Take  by mouth daily. DULoxetine 30 mg capsule Commonly known as:  CYMBALTA Take 30 mg by mouth nightly. ferrous sulfate 325 mg (65 mg iron) tablet Take  by mouth Daily (before breakfast). gabapentin 300 mg capsule Commonly known as:  NEURONTIN  
TAKE 1 CAPSULE BY MOUTH NIGHTLY  
  
 hydrALAZINE 50 mg tablet Commonly known as:  APRESOLINE Take 25 mg by mouth three (3) times daily. KLOR-CON 10 10 mEq tablet Generic drug:  potassium chloride SR Take 10 mEq by mouth every evening. LIPITOR 40 mg tablet Generic drug:  atorvastatin Take  by mouth nightly. metoprolol tartrate 50 mg tablet Commonly known as:  LOPRESSOR Take  by mouth two (2) times a day. oxyCODONE-acetaminophen 5-325 mg per tablet Commonly known as:  PERCOCET Take 1 Tab by mouth every six (6) hours as needed for Pain. raNITIdine 150 mg tablet Commonly known as:  ZANTAC TAKE 1 TABLET BY MOUTH TWICE A DAY  
  
 VITAMIN C 500 mg tablet Generic drug:  ascorbic acid (vitamin C) Take 500 mg by mouth daily. * warfarin 2 mg tablet Commonly known as:  COUMADIN  
TAKE 2 AND 1/2 TABLETS BY MOUTH DAILY * warfarin 3 mg tablet Commonly known as:  COUMADIN  
TAKE 1 TABLET BY MOUTH DIALY * warfarin 6 mg tablet Commonly known as:  COUMADIN Take 1 Tab by mouth daily. * warfarin 6 mg tablet Commonly known as:  COUMADIN  
TAKE 1 TABLET DAILY * Notice: This list has 4 medication(s) that are the same as other medications prescribed for you. Read the directions carefully, and ask your doctor or other care provider to review them with you. Prescriptions Printed Refills  
 warfarin (COUMADIN) 6 mg tablet 3 Sig: TAKE 1 TABLET DAILY Class: Print We Performed the Following AMB POC PT/INR [53773 CPT(R)] Follow-up Instructions Return if symptoms worsen or fail to improve. Introducing [facility]! [facility] introduces [employer] patient portal. Now you can access parts of your medical record, email your doctor's office, and request medication refills online. 1. In your internet browser, go to [email or web address] 2. Click on the First Time User? Click Here link in the Sign In box. You will see the New Member Sign Up page. 3. Enter your [employer] Access Code exactly as it appears below. You will not need to use this code after youve completed the sign-up process. If you do not sign up before the expiration date, you must request a new code. · [employer] Access Code: [email or web address] Expires: 10/9/2017 10:08 AM 
 
4.  Enter the last four digits of your Social Security Number (xxxx) and Date of Birth (mm/dd/yyyy) as indicated and click Submit. You will be taken to the next sign-up page. 5. Create a  ID. This will be your  login ID and cannot be changed, so think of one that is secure and easy to remember. 6. Create a  password. You can change your password at any time. 7. Enter your Password Reset Question and Answer. This can be used at a later time if you forget your password. 8. Enter your e-mail address. You will receive e-mail notification when new information is available in [address] 9. Click Sign Up. You can now view and download portions of your medical record. 10. Click the Download Summary menu link to download a portable copy of your medical information. If you have questions, please visit the Frequently Asked Questions section of the  website. Remember,  is NOT to be used for urgent needs. For medical emergencies, dial 911. Now available from your iPhone and Android! Please provide this summary of care documentation to your next provider. Your primary care clinician is listed as ALFREDO LANDRY. If you have any questions after today's visit, please call [phone].

## 2017-08-17 NOTE — PROGRESS NOTES
Patient here for hospital f/u s/p left AKA. Wife states he lives in an assisted living facility who gives him his medication. She states [facility]-home health nurses have been seeing him for PT/OT and nursing. Wife does not think any one has been checking hid INR since discharge. He is currently on 5 mg coumadin. Wife states he is also on potassium ( also on hospital discharge list,) but not on [facility] [place]. Wife also asking if [facility] can check his INR instead of bringing him in every couple weeks. 1. Have you been to the ER, urgent care clinic since your last visit? Hospitalized since your last visit? yes  See notes    2. Have you seen or consulted any other health care providers outside of the [address] [place] [address] since your last visit? Include any pap smears or colon screening. No     Results for orders placed or performed in visit on 08/17/17   AMB POC PT/INR   Result Value Ref Range    VALID INTERNAL CONTROL POC Yes     Prothrombin time (POC)  seconds    INR POC 1.2      Pt is at NH full time, poor PO and on 5mg coumadin      Chief Complaint   Patient presents with   [facility] Follow Up     s/o left AKA.  Decubitus Ulcer     sacrum    Foot Ulcer     right heel    Coagulation disorder     inr     he is a 64y.o. year old male who presents for evalution. Reviewed PmHx, RxHx, FmHx, SocHx, AllgHx and updated and dated in the chart.     Patient Active Problem List    Diagnosis    Unilateral AKA ([facility]    Advance care planning     Has LW      DVT (deep venous thrombosis) (HCC)    AF (atrial fibrillation) (HCC)    GI bleed    Leucocytosis    Abdominal pain    Cellulitis of leg, right    TIA (transient ischemic attack)    Other peripheral vascular disease    Renal failure    UTI (urinary tract infection)    Urinary retention    Parkinson disease ([facility]    Weakness of left leg    Hyperkalemia    Anemia    Insomnia    Hypercholesteremia    HTN (hypertension)    [facility]) [facility]    Carotid stenosis    GERD (gastroesophageal reflux disease)    CRI (chronic renal insufficiency)       Review of Systems - negative except as listed above in the HPI    Objective:     Vitals:    08/17/17 1507   BP: 126/53   Pulse: 60   Resp: 16   Temp: 98 °F (36.7 °C)   SpO2: 97%   Weight: 119 lb (54 kg)     Physical Examination: General appearance - chronically ill appearing  Chest - clear to auscultation, no wheezes, rales or rhonchi, symmetric air entry  Heart - normal rate, regular rhythm, normal S1, S2, no murmurs, rubs, clicks or gallops  LEft AKA    Assessment/ Plan:   Diagnoses and all orders for this visit:    1. Coagulation deficiency (HCC)  -     AMB POC PT/INR  -     warfarin (COUMADIN) 6 mg tablet; TAKE 1 TABLET DAILY  -not at goal  -  Results for orders placed or performed in visit on 08/17/17   AMB POC PT/INR   Result Value Ref Range    VALID INTERNAL CONTROL POC Yes     Prothrombin time (POC)  seconds    INR POC 1.2      -inc to 6mg and check next week at [facility]    2. Unilateral AKA, left (HCC)  -doing poorly  -not eating   -FTT  -will dwp wife hospice at next OV    3. Essential hypertension  At goal    4. CRI (chronic renal insufficiency), stage 3 (moderate)  -wife wants refer    5. Parkinson disease ([facility]  -advanced and end stage     Follow-up Disposition:  Return if symptoms worsen or fail to improve. I have discussed the diagnosis with the patient and the intended plan as seen in the above orders. The patient understands and agrees with the plan. The patient has received an after-visit summary and questions were answered concerning future plans. Medication Side Effects and Warnings were discussed with patient  Patient Labs were reviewed and or requested:  Patient Past Records were reviewed and or requested    Abdulaziz Ceron M.D. There are no Patient Instructions on file for this visit.

## 2017-08-18 NOTE — Clinical Note
Dr Saundra Krishnamurthy, FYI---8/18/17, This writer/NN received return call from [facility], Skilled Nurse, Mery, [phone], states made visit today, found patient attempting to get out of bed(around 1:30pm, had not been taken to lunch), able to get staff to assist and lunch ordered. Mery states working with wife to discuss next/long term plans, [facility] patient is on waiting list at the [address] [place] [address], [facility], however, may be year waiting, PT to assess if inpatient REHAB may be option. Mery agrees to also home/hospice services. Mery [facility] next scheduled visit is Tuesday, 8/22/17, plans to continue twice weekly visits at this time, agrees to keep IFP/NN contact information to call with further updates, questions or concerns. I will keep you posted.  Thanks, Erich Medina

## 2017-08-18 NOTE — PROGRESS NOTES
8/18/17, NN Documentation Note. Patient kept F/U appt with Dr Ya Mcbirde, 8/17/17. Patient remains at [facility]. Dr Ya Mcbride plans to discuss Hospice Services at next office visit, states [facility] is currently assisting(they have a Hospice division). This writer/NN agrees to reach out to [facility]. 8/18/17, This writer/NN contacted [facility], [phone], spoke to [place], [facility] services began 8/14/17 and next scheduled visit is 8/18/17, agrees to send message to nurse with IFP/NN contact information and request for return call. PLAN: Continue to assist with Transitions of Care. Discuss further symptoms or concerns,  medications(compliance and reconciliation), HH progress and F/U appts. Provide instruction, goal work and resource connection as indicated  See Previous NN/Patient Outreach Documentation:  Patient at [facility], 5/5/17 - 8/11/17,  S/P 4/27/17 - 05/05/17 at [facility], related to Left Above Knee Amputation on 4/28/17, dishcarged to [facility], has scheduled F/U appt with Dr Ya Mcbride, 8/14/17, however, patient has required further hospitalization, [facility], 8/12/17-8/14/17, related to fall, returned to [facility]. 8/15/17, This writer/NN and [facility] access, able to confirm, above hospitalization, printed discharge summary for Dr Ya Mcbride to review. 8/15/17, This writer/NN contacted [facility], [phone], spoke to Nursing Staff, Michell Benedict, states patient has returned to facility, agrees to have medication reconciliation faxed to Dr Ya Mcbride, as patient has scheduled F/U appt 8/17/17, and agrees to keep IFP/NN contact information to call with further questions or concerns. Michell Benedict states HH has not started at this time. 8/15/17, This writer/NN contacted At [facility], spoke to Norma(will be out of [place] office, [phone]), states have not been notified of discharge.  This writer/NN explained above hospitalization and outreach to [facility], patient currently there. Jocelyn Hdz understanding, agrees to notify  of discharge and keep IFP/NN contact information to call with further questions or concerns. See Previous NN/Patient Outreach Documentation:  F/U appt has been changed to 8/17/17 at 2:10pm  8/14/17, This writer/NN contacted At [facility], [phone], to discuss above hospitalization. Kurt [place] understanding and agrees to have Liaison reach out to [facility]. Shelia Butt agrees to keep IFP/NN contact information to call with further updates, questions or concerns. See Previous NN/Patient Outreach Documentation:  S/P [facility] ED, 6/27/17 related to Right Great Toe Injury. Patient S/P 4/27/17 - 05/05/17 at [facility], related to Left Above Knee Amputation on 4/28/17, discharged to [address] [place] [address](5/5/17 - 8/11/17).  8/11/17, Care Coordination Documentation: This writer/NN received message from CCT/LUPE, Margaret, :  [facility], Plan for DC to [facility] assisted on 8/11 with At [address] to be provided through [address] to receive wound care 3 times per week.  PCP follow up 8/14 at 2:30 PM with Dr. Caroline Connolly  Wife is primary caregiver, also cares for adult daughter in the home  8/11/17, This writer/NN contacted [facility], [phone], patient just arriving, Nurse Andie Zhang states not aware of above appt, states usually cannot assist with transportation after 1pm, states wife coming in to see patient and will discuss further with her and ask that she return call. 8/11/17, This writer/ [facility], [phone], spoke to Shelia Butt, states has not received intake information, states Liaison is Coco Arita, will reach out and request return call. 8/11/17, This writer/NN contacted [id number], [phone], spoke to states Isabel Tara Callas unavailable at this time, agrees to provide message to discuss F/U appt, [place] agency and request for return call.   8/11/17, Per chart review, appt has been changed to 8/17/17 at 2:10pm  See Previous NN/Patient Outreach Documentation:  8/1/17, This writer/NN contacted [facility], [phone], spoke to Mariano Miller, able to confirm patient remains at facility at this time. See Previous NN/Patient Outreach Documentation:  Patient seen at [facility] ED, 6/27/17 related to Right Great Toe Injury. Patient S/P 4/27/17 - 05/05/17 at [facility], related to Left Above Knee Amputation on 4/28/17, discharged to [address] [place] [address], remains at facility, 6/22/17. Printed ED Provider Note for Dr Nathan Clifford to review. Per Documentation, Compression placed, Surgifoam placed over skin tear around nail bed. Hold COUMADIN Dose today, 6/27/17.  6/27/17, This writer/NN contacted [address] [place] [address], [phone], able to confirm patient has returned to facility, nurse today, 6/27/17 will be Massiel, however, not available for call at this time, will provide message with IFP/NN contact information, above recommendation to hold Coumadin dose today, 6/27/17, and call with further questions or concerns.    See Previous NN/Patient Outreach Documentation:  Patient currently at [address] [place] [address]. S/P 4/27/17 - 05/05/17 at [facility], related to Left Above Knee Amputation on 4/28/17, discharged to [address] [place] [address]. 6/15/17, This writer/NN contacted [address] [place] [address], [phone], spoke to Isabel, able to confirm patient remains at facility, spoke to Nurse, Emily Holley, states no mention of discharge at this time, agrees to leave message for , Nelson Perrin, with IFP/NN Contact information and request for return call.    See Previous NN/Patient Outreach Documentation:  5/24/17, This writer/NN contacted [address] [place] [address], [phone], spoke to Isabel, able to confirm patient remains at facility in room 408, however, , Nelson Perrin, not available at this time, on hold and finally allowed to speak to Nurse, Massiel, states patient has been up most of the day, staples recently out, continues to participate in therapies and wife visits almost daily. Massiel agrees to provide IFP/NN contact information to have Layo Rehman return call with further discharge plans, updates or concerns. See Previous NN/Patient Outreach Documentation:  4/28/17, Patient listed on Daily Census/MSSP Report with alert of [facility]/Hospitalization. This writer/NN and [facility] access, able to confirm, patient admitted to [facility] related to Left leg pain/numbness(history of PVD with multiple bypasses and stents, followed by Dr Trina Krishna). Printed H&P, Arteriogram/Angioplasty Report and recent progress note for Dr Tiffany Camargo to review, as patient seen, 3/23/17. 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 Left lower extremity cold to touch(mid calf to foot), decreased motor and sensory to left foot.  Right Groin with Lysis Catheter in place/IR assisting

## 2017-08-18 NOTE — PROGRESS NOTES
8/18/17, This writer/NN received return call from [facility], Skilled Nurse, Mery, [phone], states made visit today, found patient attempting to get out of bed(around 1:30pm, had not been taken to lunch), able to get staff to assist and lunch ordered. [facility] working with wife to discuss next/long term plans, [facility] patient is on waiting list at the [address] [place] [address], [facility], however, may be year waiting, PT to assess if inpatient REHAB may be option. Mery agrees to also home/hospice services. [facility] next scheduled visit is Tuesday, 8/22/17, plans to continue twice weekly visits at this time, agrees to keep IFP/NN contact information to call with further updates, questions or concerns. See Previous NN/Patient Outreach Documentation:  8/18/17, NN Documentation Note. Patient kept F/U appt with Dr Fabiola Hsieh, 8/17/17. Patient remains at [facility]. Dr Fabiola Hsieh plans to discuss Hospice Services at next office visit, [facility] is currently assisting(they have a Hospice division). This writer/NN agrees to reach out to [facility]. 8/18/17, This writer/NN contacted [facility], [phone], spoke to [place], [facility] services began 8/14/17 and next scheduled visit is 8/18/17, agrees to send message to nurse with IFP/NN contact information and request for return call. PLAN: Continue to assist with Transitions of Care. Discuss further symptoms or concerns,  medications(compliance and reconciliation), HH progress and F/U appts.  Provide instruction, goal work and resource connection as indicated  See Previous NN/Patient Outreach Documentation:  Patient at [address] of [address] [place] [address], 5/5/17 - 8/11/17,  S/P 4/27/17 - 05/05/17 at [facility], related to Left Above Knee Amputation on 4/28/17, dishcarged to [place] [facility], has scheduled F/U appt with Dr Fabiola Hsieh, 8/14/17, however, patient has required further hospitalization, [facility], 8/12/17-8/14/17, related to fall, returned to [facility]. 8/15/17, This writer/NN and [facility] access, able to confirm, above hospitalization, printed discharge summary for Dr Cintron Reasons to review. 8/15/17, This writer/NN contacted Melissa, [phone], spoke to Nursing Staff, Felisha Mcluaghlin, states patient has returned to facility, agrees to have medication reconciliation faxed to Dr Cintron Reasons, as patient has scheduled F/U appt 8/17/17, and agrees to keep IFP/NN contact information to call with further questions or concerns. Felisha Mclaughlin states HH has not started at this time. 8/15/17, This writer/NN contacted At [facility], spoke to [facility](will be out of [facility] office, [phone]), states have not been notified of discharge. This writer/NN explained above hospitalization and outreach to [facility], patient currently there. Lena Wagner understanding, agrees to notify  of discharge and keep IFP/NN contact information to call with further questions or concerns. See Previous NN/Patient Outreach Documentation:  F/U appt has been changed to 8/17/17 at 2:10pm  8/14/17, This writer/NN contacted At [facility], [phone], to discuss above hospitalization. Letitia Wray understanding and agrees to have Liaison reach out to [facility]. Manijosef Hassan agrees to keep IFP/NN contact information to call with further updates, questions or concerns. See Previous NN/Patient Outreach Documentation:  S/P [facility], 6/27/17 related to Right Great Toe Injury. Patient S/P 4/27/17 - 05/05/17 at [facility], related to Left Above Knee Amputation on 4/28/17, discharged to [address] [place] [address](5/5/17 - 8/11/17).  8/11/17, Care Coordination Documentation:   This writer/NN received message from CCT/LUPE, Margaret, states:  Per [facility], Plan for DC to [facility] on 8/11 with At [address] to be provided through [facility].  Patient to receive wound care 3 times per week.  PCP follow up 8/14 at 2:30 PM with Dr. Cintron Reasons  Wife is primary caregiver, also cares for adult daughter in the home  8/11/17, This writer/NN contacted Melissa, [phone], patient just arriving, Nurse [facility] not aware of above appt, states usually cannot assist with transportation after 1pm, states wife coming in to see patient and will discuss further with her and ask that she return call. 8/11/17, This writer/ S [facility], [phone], spoke to Ari Hassan, states has not received intake information, states Liaison is Ashleigh Jin, will reach out and request return call. 8/11/17, This writer/NN contacted [address] [place] [address], [phone], spoke to Mable Parry, states Coleman Glass unavailable at this time, agrees to provide message to discuss F/U appt, [facility] agency and request for return call. 8/11/17, Per chart review, appt has been changed to 8/17/17 at 2:10pm  See Previous NN/Patient Outreach Documentation:  8/1/17, This writer/NN contacted [facility], [phone], spoke to Getachew Huggins, able to confirm patient remains at facility at this time. See Previous NN/Patient Outreach Documentation:  Patient seen at [facility], 6/27/17 related to Right Great Toe Injury. Patient S/P 4/27/17 - 05/05/17 at [facility], related to Left Above Knee Amputation on 4/28/17, discharged to [id number] [place] [address], remains at facility, 6/22/17. Printed ED Provider Note for Dr Cintron Reasons to review. Per Documentation, Compression placed, Surgifoam placed over skin tear around nail bed.  Hold COUMADIN Dose today, 6/27/17.  6/27/17, This writer/NN contacted [address] [facility], [phone], able to confirm patient has returned to facility, nurse today, 6/27/17 will be Massiel, however, not available for call at this time, will provide message with IFP/NN contact information, above recommendation to hold Coumadin dose today, 6/27/17, and call with further questions or concerns.    See Previous NN/Patient Outreach Documentation:  Patient currently at [address] [facility] of [facility]. S/P 4/27/17 - 05/05/17 at [facility], related to Left Above Knee Amputation on 4/28/17, discharged to [address] [facility]. 6/15/17, This writer/NN contacted [address] [facility], [phone], spoke to Isabel, able to confirm patient remains at facility, spoke to Nurse, Amy Jackson, states no mention of discharge at this time, agrees to leave message for , Philippe Disla, with IFP/NN Contact information and request for return call. See Previous NN/Patient Outreach Documentation:  5/24/17, This writer/NN contacted [address] [facility], [phone], spoke to Isabel, able to confirm patient remains at facility in room 408, however, , Philippe Disla, not available at this time, on hold and finally allowed to speak to Nurse, Massiel, states patient has been up most of the day, staples recently out, continues to participate in therapies and wife visits almost daily. Massiel agrees to provide IFP/NN contact information to have Philippe Disla return call with further discharge plans, updates or concerns. See Previous NN/Patient Outreach Documentation:  4/28/17, Patient listed on Daily Census/MSSP Report with alert of [facility]/Hospitalization. This writer/NN and [facility] access, able to confirm, patient admitted to [facility] related to Left leg pain/numbness(history of PVD with multiple bypasses and stents, followed by Dr Minnie Iniguez). Printed H&P, Arteriogram/Angioplasty Report and recent progress note for Dr Fara Mcdaniel to review, as patient seen, 3/23/17. 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 Left lower extremity cold to touch(mid calf to foot), decreased motor and sensory to left foot.  Right Groin with Lysis Catheter in place/IR assisting

## 2017-08-21 NOTE — PROGRESS NOTES
[place] of [address] request for order was put on [facility] desk to process

## 2017-08-30 NOTE — TELEPHONE ENCOUNTER
Patient is actually on 6mg everyday based on OV and med refill history. He need to decrease back to 5mg every day and recheck on Tuesday.  Shannan Celaya

## 2017-08-30 NOTE — TELEPHONE ENCOUNTER
Reyes Links from [facility] at [facility] #[phone]    INR results   PT- 46.2  INR- 4.28    Coumadin dose is 3 mg per day.

## 2017-08-31 NOTE — TELEPHONE ENCOUNTER
[facility] from morning side at [facility] calling about the message from yesterday with pt's PT/INR results and the decrease of his coumadin dosage. She states they need to confirm this because the results were PT 46.2 and INR 4.28 and these are abnormal. Please call them back at [phone].

## 2017-09-01 NOTE — PROGRESS NOTES
[place] of [place] order request was put on [facility] desk to process

## 2017-09-05 NOTE — PROGRESS NOTES
Chief Complaint   Patient presents with    Wound Check     Pt presents to the office for wound check    1. Have you been to the ER, urgent care clinic since your last visit? Hospitalized since your last visit? No    2. Have you seen or consulted any other health care providers outside of the [address] [place] [address] since your last visit? Include any pap smears or colon screening. No      Fell last week and went to ER        Chief Complaint   Patient presents with    Wound Check     he is a 64y.o. year old male who presents for evalution. Reviewed PmHx, RxHx, FmHx, SocHx, AllgHx and updated and dated in the chart. Patient Active Problem List    Diagnosis    Unilateral AKA ([facility]    Advance care planning     Has LW      DVT (deep venous thrombosis) (HCC)    AF (atrial fibrillation) (HCC)    GI bleed    Leucocytosis    Abdominal pain    Cellulitis of leg, right    TIA (transient ischemic attack)    Other peripheral vascular disease    Renal failure    UTI (urinary tract infection)    Urinary retention    Parkinson disease ([facility]    Weakness of left leg    Hyperkalemia    Anemia    Insomnia    Hypercholesteremia    HTN (hypertension)    PVD (peripheral vascular disease) (HCC)    Carotid stenosis    GERD (gastroesophageal reflux disease)    CRI (chronic renal insufficiency)       Review of Systems - negative except as listed above in the HPI    Objective:     Vitals:    09/05/17 1327   BP: 142/56   Pulse: (!) 54   Resp: 18   Temp: 98.5 °F (36.9 °C)   TempSrc: Oral   SpO2: 98%   Height: 5' 6\" (1.676 m)     Physical Examination: General appearance - alert, well appearing, and in no distress      Assessment/ Plan:   Diagnoses and all orders for this visit:    1.  PVD (peripheral vascular disease) (HCC)  -     AMB POC PT/INR  -  Results for orders placed or performed in visit on 09/05/17   AMB POC PT/INR   Result Value Ref Range    VALID INTERNAL CONTROL POC Yes     Prothrombin time (POC) 25.5 seconds    INR POC 2.1      -at goal    2. Parkinson disease ([address]  -end stage  '-[facility]  Hospice, they have met with him and think he is not a candidate    3. Encounter for immunization  -     Influenza virus vaccine ([address]) 72 years and older ([address])  -     ADMIN PNEUMOCOCCAL VACCINE  Medicare Injection Admin Charge       Follow-up Disposition: Not on File    I have discussed the diagnosis with the patient and the intended plan as seen in the above orders. The patient understands and agrees with the plan. The patient has received an after-visit summary and questions were answered concerning future plans. Medication Side Effects and Warnings were discussed with patient  Patient Labs were reviewed and or requested:  Patient Past Records were reviewed and or requested    Bee Peter M.D. There are no Patient Instructions on file for this visit.

## 2017-09-05 NOTE — PROGRESS NOTES
9/5/17, Patient listed on Daily Census/MSSP Report with alert of H[facility]/ED visit. This writer/NN and [facility] access, able to confirm, patient seen, 9/1/17 at [facility]ED related to Right Lower Extremity Swelling, negative Doppler, no need for hospitalization, given Lasix with good diuresis/improvement in swelling. Instruction on importance of leg elevation and benefits of compression stockings. Recommended continue Lasix, 20mg, daily for 3 days. (BUN/Cr at 21/1.44)Printed ED Provider note for Dr Jay Sandoval to review, as patient has scheduled F/U appt with Dr Jay Sandoval, today, 9/5/17 at 1:15pm.   9/5/17, Patient kept F/U appt with Dr Jay Sandoval today, 9/5/17.  9/5/17, This writer/NN received return call from [facility], Skilled Nurse, Mery, [phone], states patient should have received Lasix, 9/3/17, 9/4/17 and 9/5/17. Mery states patient upset with wheelchair, states scooter gave him more independence. Vidalia states therapy also continues to work with patient. This writer/NN agrees to return call with further orders or concerns per Dr Jay Sandoval. Mery agrees to keep IFP/NN contact information to call with further questions or concerns. PLAN: Continue to assist with Transitions of Care. Discuss further symptoms or concerns,  medications(compliance and reconciliation), HH progress and F/U appts. Provide instruction, goal work and resource connection as indicated  See Previous NN/Patient Outreach Documentation:  8/18/17, This writer/NN received return call from [facility], Skilled Nurse, Mery, [phone], states made visit today, found patient attempting to get out of bed(around 1:30pm, had not been taken to lunch), able to get staff to assist and lunch ordered. Mery cee working with wife to discuss next/long term plans, states patient is on waiting list at the [address] [place] [address], [facility], however, may be year waiting, PT to assess if inpatient REHAB may be option. Mery agrees to also home/hospice services.  Mery [facility] next scheduled visit is Tuesday, 8/22/17, plans to continue twice weekly visits at this time, agrees to keep IFP/NN contact information to call with further updates, questions or concerns. See Previous NN/Patient Outreach Documentation:  8/18/17, NN Documentation Note. Patient kept F/U appt with Dr Saundra Krishnamurthy, 8/17/17. Patient remains at [facility]. Dr Saundra Krishnamurthy plans to discuss Hospice Services at next office visit, states [facility] is currently assisting(they have a Hospice division). This writer/NN agrees to reach out to [facility]. 8/18/17, This writer/NN contacted [facility], [phone], spoke to [place], [facility] services began 8/14/17 and next scheduled visit is 8/18/17, agrees to send message to nurse with IFP/NN contact information and request for return call. See Previous NN/Patient Outreach Documentation:  Patient at [address] [facility], 5/5/17 - 8/11/17,  S/P 4/27/17 - 05/05/17 at [facility], related to Left Above Knee Amputation on 4/28/17, dish[facility]arged to [facility], has scheduled F/U appt with Dr Saundra Krishnamurthy, 8/14/17, however, patient has required further hospitalization, [facility], 8/12/17-8/14/17, related to fall, returned to [facility]. 8/15/17, This writer/NN and [facility] access, able to confirm, above hospitalization, printed discharge summary for Dr Saundra Krishnamurthy to review. 8/15/17, This writer/NN contacted [facility], [phone], spoke to Nursing Staff, Monika [place], [facility] patient has returned to facility, agrees to have medication reconciliation faxed to Dr Saundra Krishnamurthy, as patient has scheduled F/U appt 8/17/17, and agrees to keep IFP/NN contact information to call with further questions or concerns. Monika Marshall states HH has not started at this time. 8/15/17, This writer/NN contacted At [facility], spoke to Norma(will be out of [place] office, [phone]), states have not been notified of discharge.  This writer/NN explained above hospitalization and outreach to [facility] [facility], patient currently there. Hellen Reese understanding, agrees to notify  of discharge and keep IFP/NN contact information to call with further questions or concerns. See Previous NN/Patient Outreach Documentation:  F/U appt has been changed to 8/17/17 at 2:10pm  8/14/17, This writer/NN contacted At [facility], [phone], to discuss above hospitalization. Julito Vasquez understanding and agrees to have Liaison reach out to [facility]. Lesly Flores agrees to keep IFP/NN contact information to call with further updates, questions or concerns. See Previous NN/Patient Outreach Documentation:  S/P [facility], 6/27/17 related to Right Great Toe Injury. Patient S/P 4/27/17 - 05/05/17 at [facility], related to Left Above Knee Amputation on 4/28/17, discharged to [address] [place] [address](5/5/17 - 8/11/17).  8/11/17, Care Coordination Documentation: This writer/NN received message from CCT/LUPE, Margaret, states:  Per [facility], Plan for DC to [facility] on 8/11 with At [address] to be provided through [address] to receive wound care 3 times per week.  PCP follow up 8/14 at 2:30 PM with Dr. Bray Ours  Wife is primary caregiver, also cares for adult daughter in the home  8/11/17, This writer/NN contacted [facility], [phone], patient just arriving, Nurse Sneha Guerrero states not aware of above appt, states usually cannot assist with transportation after 1pm, states wife coming in to see patient and will discuss further with her and ask that she return call. 8/11/17, This writer/ S [facility], [phone], spoke to Lesly Flores, states has not received intake information, states Liaison is Indu [place], will reach out and request return call.    8/11/17, This writer/NN contacted [address] [facility], [phone], spoke to Avani Mclaughlin, states Diana Serrano unavailable at this time, agrees to provide message to discuss F/U appt, [facility] agency and request for return call. 8/11/17, Per chart review, appt has been changed to 8/17/17 at 2:10pm  See Previous NN/Patient Outreach Documentation:  8/1/17, This writer/NN contacted [facility], [phone], spoke to Amparo, able to confirm patient remains at facility at this time. See Previous NN/Patient Outreach Documentation:  Patient seen at [facility], 6/27/17 related to Right Great Toe Injury. Patient S/P 4/27/17 - 05/05/17 at [facility], related to Left Above Knee Amputation on 4/28/17, discharged to [address] [place] [address], remains at facility, 6/22/17. Printed ED Provider Note for Dr Essence Shafer to review. Per Documentation, Compression placed, Surgifoam placed over skin tear around nail bed. Hold COUMADIN Dose today, 6/27/17.  6/27/17, This writer/NN contacted [address] [place] [address], [phone], able to confirm patient has returned to facility, nurse today, 6/27/17 will be Massiel, however, not available for call at this time, will provide message with IFP/NN contact information, above recommendation to hold Coumadin dose today, 6/27/17, and call with further questions or concerns.    See Previous NN/Patient Outreach Documentation:  Patient currently at [address] [place] [address]. S/P 4/27/17 - 05/05/17 at [facility], related to Left Above Knee Amputation on 4/28/17, discharged to [address] [place] [address]. 6/15/17, This writer/NN contacted [address] [place] [address], [phone], spoke to Isabel, able to confirm patient remains at facility, spoke to Nurse, Tad Sneed, states no mention of discharge at this time, agrees to leave message for , Renée Patton, with IFP/NN Contact information and request for return call.    See Previous NN/Patient Outreach Documentation:  5/24/17, This writer/NN contacted [address] [place] [address], [phone], spoke to Isabel, able to confirm patient remains at facility in room 408, however, , Renée Patton, not available at this time, on hold and finally allowed to speak to Nurse, Massiel, states patient has been up most of the day, staples recently out, continues to participate in therapies and wife visits almost daily. Massiel agrees to provide IFP/NN contact information to have Diana Found return call with further discharge plans, updates or concerns. See Previous NN/Patient Outreach Documentation:  4/28/17, Patient listed on Daily Census/MSSP Report with alert of [facility]/Hospitalization. This writer/NN and [facility] access, able to confirm, patient admitted to [facility] related to Left leg pain/numbness(history of PVD with multiple bypasses and stents, followed by Dr Ariella Ramos). Printed H&P, Arteriogram/Angioplasty Report and recent progress note for Dr Oksana Puga to review, as patient seen, 3/23/17. 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 Left lower extremity cold to touch(mid calf to foot), decreased motor and sensory to left foot.  Right Groin with Lysis Catheter in place/IR assisting

## 2017-09-06 NOTE — TELEPHONE ENCOUNTER
Received fax 9/1 from 8/30 stating pt c/o pain to R lower extremity, leg noted slightly swollen with reddness, discoloration to toes, asking for advice. Adalberto Gee called and caregiver states pt's condition has worsened and swelling was worse, toes were so swollen looked like toenails about to pop off, advised pt to go to ER for further evaluation.

## 2017-09-11 NOTE — TELEPHONE ENCOUNTER
Pt's son Kristal Cruz would like to discuss why hospice was mention to pt's wife at the last appointment.    Kristal Cruz would like clarification, [phone]

## 2017-09-13 NOTE — PROGRESS NOTES
Hospice Eval order faxed to Arlette He, [facility]. At [phone], confirmed, scanned.

## 2017-09-14 NOTE — PROGRESS NOTES
[place] of [address] request for medication request was put on [facility] desk to process

## 2017-09-14 NOTE — TELEPHONE ENCOUNTER
Nurse from [facility] called and states patient is having phantom pain, and 1 oxycodone every 6 hours is not really helping him. Can we increase this or give him something else? Also can they have an order for bisacodyl suppository daily as needed for constipation? Fax hard copy to [facility] [phone]  Attn: Kassidy Dozier.

## 2017-09-15 NOTE — TELEPHONE ENCOUNTER
Spoke with Sandralee Favre, pt's pulse is ranging between 41-48. She recommends writing order that says pt's HR must be checked and if below 60 to hold digoxin. She will check on him again next week. Authorized order.

## 2017-09-15 NOTE — TELEPHONE ENCOUNTER
Kristy Backers from [facility]. @ [phone] would like a call re: patient's heart rate in 40s. Wants to make changes with the digoxin . Please call.

## 2017-09-19 NOTE — PROGRESS NOTES
[facility] order was put on [facility] [employer] desk to process

## 2017-10-02 NOTE — TELEPHONE ENCOUNTER
Lucille Beth calling from [facility] and she states that they need to get a written rx sent to them for the medication oxyCodone-Percocet. I explained that Dr Mcgee Hash not back in office till Monday and I see the medication but it has a historical doctor name. Will have the nurse call them back on Monday at [phone].

## 2017-10-09 ENCOUNTER — PATIENT OUTREACH (OUTPATIENT)
Dept: FAMILY MEDICINE CLINIC | Age: 77
End: 2017-10-09

## 2017-10-09 NOTE — PROGRESS NOTES
10/9/17, IFP staff received message with notification, patient passed away, 10/8/17. Will close NN/CCM episode and continue prayers for the family at this difficult time. See Previous NN/Patient Outreach Documentation:  9/25/17, Per Chart Review, Patient kept F/U appt with Dr Glendy Cr, 9/5/17, [facility] [address].  9/25/17, This writer/NN contacted Ricarda Kearney, [phone], spoke to [facility] AT [facility] patient remains at facility at this time, however, nurse not available at this time, agrees to keep IFP/NN contact information to call with further updates, questions or concerns. See Previous NN/Patient Outreach Documentation:  9/5/17, Patient listed on Daily Census/MSSP Report with alert of [facility]/ED visit. This writer/NN and [facility] access, able to confirm, patient seen, 9/1/17 at [facility]/ED related to Right Lower Extremity Swelling, negative Doppler, no need for hospitalization, given Lasix with good diuresis/improvement in swelling. Instruction on importance of leg elevation and benefits of compression stockings. Recommended continue Lasix, 20mg, daily for 3 days. (BUN/Cr at 21/1.44)Printed ED Provider note for Dr Glendy Cr to review, as patient has scheduled F/U appt with Dr Glendy Cr, today, 9/5/17 at 1:15pm.   9/5/17, Patient kept F/U appt with Dr Glendy Cr today, 9/5/17.  9/5/17, This writer/NN received return call from [facility], Skilled Nurse, Mery, [phone], states patient should have received Lasix, 9/3/17, 9/4/17 and 9/5/17. Mery states patient upset with wheelchair, states scooter gave him more independence. Mery states therapy also continues to work with patient. This writer/NN agrees to return call with further orders or concerns per Dr Glendy Cr. Mery agrees to keep IFP/NN contact information to call with further questions or concerns. PLAN: Continue to assist with Transitions of Care. Discuss further symptoms or concerns,  medications(compliance and reconciliation), HH progress and F/U appts. Provide instruction, goal work and resource connection as indicated  See Previous NN/Patient Outreach Documentation:  8/18/17, This writer/NN received return call from [facility], Skilled Nurse, Mery, [phone], states made visit today, found patient attempting to get out of bed(around 1:30pm, had not been taken to lunch), able to get staff to assist and lunch ordered. [facility] working with wife to discuss next/long term plans, [facility] patient is on waiting list at the [address] [place] [address], [facility], however, may be year waiting, PT to assess if inpatient REHAB may be option. Mery agrees to also home/hospice services. [facility] next scheduled visit is Tuesday, 8/22/17, plans to continue twice weekly visits at this time, agrees to keep IFP/NN contact information to call with further updates, questions or concerns. See Previous NN/Patient Outreach Documentation:  8/18/17, NN Documentation Note. Patient kept F/U appt with Dr Tawanna Morel, 8/17/17. Patient remains at [facility]. Dr Tawanna Morel plans to discuss Hospice Services at next office visit, states [facility] is currently assisting(they have a Hospice division). This writer/NN agrees to reach out to [facility]. 8/18/17, This writer/NN contacted [facility], [phone], spoke to Marquette, [facility] services began 8/14/17 and next scheduled visit is 8/18/17, agrees to send message to nurse with IFP/NN contact information and request for return call. See Previous NN/Patient Outreach Documentation:  Patient at [address] [facility], 5/5/17 - 8/11/17,  S/P 4/27/17 - 05/05/17 at [facility], related to Left Above Knee Amputation on 4/28/17, dishcarged to [place] [facility], has scheduled F/U appt with Dr Tawanna Morel, 8/14/17, however, patient has required further hospitalization, [facility], 8/12/17-8/14/17, related to fall, [facility].   8/15/17, This writer/NN and [facility] access, able to confirm, above hospitalization, printed discharge summary for Dr Daryle Better to review. 8/15/17, This writer/NN contacted Melissa, [phone], spoke to Nursing Staff, Abisai Rodas, states patient has returned to facility, agrees to have medication reconciliation faxed to Dr Daryle Better, as patient has scheduled F/U appt 8/17/17, and agrees to keep IFP/NN contact information to call with further questions or concerns. Abisai Rodas states HH has not started at this time. 8/15/17, This writer/NN contacted At [facility], spoke to Norma(will be out of [place] office, [phone]), states have not been notified of discharge. This writer/NN explained above hospitalization and outreach to [facility], patient currently there. Rogelio Salguero understanding, agrees to notify  of discharge and keep IFP/NN contact information to call with further questions or concerns. See Previous NN/Patient Outreach Documentation:  F/U appt has been changed to 8/17/17 at 2:10pm  8/14/17, This writer/NN contacted At [facility], [phone], to discuss above hospitalization. Chang Ross understanding and agrees to have Liaison reach out to [facility]. Anastasiya Langston agrees to keep IFP/NN contact information to call with further updates, questions or concerns. See Previous NN/Patient Outreach Documentation:  S/P [facility] ED, 6/27/17 related to Right Great Toe Injury. Patient S/P 4/27/17 - 05/05/17 at [facility], related to Left Above Knee Amputation on 4/28/17, discharged to [address] [place] [address](5/5/17 - 8/11/17).  8/11/17, Care Coordination Documentation:   This writer/NN received message from CCT/LUPE, Margaret, states:  Per [facility], Plan for DC to [facility] on 8/11 with At [address] to be provided through [facility].  Patient to receive wound care 3 times per week.  PCP follow up 8/14 at 2:30 PM with Dr. Daryle Better  Wife is primary caregiver, also cares for adult daughter in the home  8/11/17, This writer/NN contacted [place] [facility], [phone], patient just arriving, Nurse Grace Melgoza states not aware of above appt, states usually cannot assist with transportation after 1pm, states wife coming in to see patient and will discuss further with her and ask that she return call. 8/11/17, This writer/ S [facility], [phone], spoke to Damian Beck, states has not received intake information, states Liaison is Soledad Roman, will reach out and request return call. 8/11/17, This writer/NN contacted [address] [place] [address], [phone], spoke to Isabel, [facility] at this time, agrees to provide message to discuss F/U appt, [facility] agency and request for return call. 8/11/17, Per chart review, appt has been changed to 8/17/17 at 2:10pm  See Previous NN/Patient Outreach Documentation:  8/1/17, This writer/NN contacted [facility], [phone], spoke to Fahad Castillo, able to confirm patient remains at facility at this time. See Previous NN/Patient Outreach Documentation:  Patient seen at [facility] ED, 6/27/17 related to Right Great Toe Injury. Patient S/P 4/27/17 - 05/05/17 at [facility], related to Left Above Knee Amputation on 4/28/17, discharged to [id number] [place] [address], remains at facility, 6/22/17. Printed ED Provider Note for Dr Kassie Lloyd to review. Per Documentation, Compression placed, Surgifoam placed over skin tear around nail bed. Hold COUMADIN Dose today, 6/27/17.  6/27/17, This writer/NN contacted [address] [place] [address], [phone], able to confirm patient has returned to facility, nurse today, 6/27/17 will be Massiel, however, not available for call at this time, will provide message with IFP/NN contact information, above recommendation to hold Coumadin dose today, 6/27/17, and call with further questions or concerns.    See Previous NN/Patient Outreach Documentation:  Patient currently at [id number] [place] [address].   S/P 4/27/17 - 05/05/17 at [facility], related to Left Above Knee Amputation on 4/28/17, discharged to [id number] [facility]. 6/15/17, This writer/NN contacted [address] [place] [address], [phone], spoke to Cornelius Bashir, able to confirm patient remains at facility, spoke to Nurse, Mathew Flores, states no mention of discharge at this time, agrees to leave message for , Cata Restrepo, with IFP/NN Contact information and request for return call. See Previous NN/Patient Outreach Documentation:  5/24/17, This writer/NN contacted [address] [place] [address], [phone], spoke to Conrelius Bashir, able to confirm patient remains at facility in room 408, however, , Cata Restrepo, not available at this time, on hold and finally allowed to speak to Nurse, Massiel, states patient has been up most of the day, staples recently out, continues to participate in therapies and wife visits almost daily. Massiel agrees to provide IFP/NN contact information to have Cata Restrepo return call with further discharge plans, updates or concerns. See Previous NN/Patient Outreach Documentation:  4/28/17, Patient listed on Daily Census/MSSP Report with alert of [facility]/Hospitalization. This writer/NN and [facility] access, able to confirm, patient admitted to [facility] related to Left leg pain/numbness(history of PVD with multiple bypasses and stents, followed by Dr Terrance Jeong). Printed H&P, Arteriogram/Angioplasty Report and recent progress note for Dr Arnoldo Jeffers to review, as patient seen, 3/23/17. 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 Left lower extremity cold to touch(mid calf to foot), decreased motor and sensory to left foot.  Right Groin with Lysis Catheter in place/IR assisting

## 2017-10-10 ENCOUNTER — DOCUMENTATION ONLY (OUTPATIENT)
Dept: FAMILY MEDICINE CLINIC | Age: 77
End: 2017-10-10

## 2017-10-10 NOTE — PROGRESS NOTES
Death Certificate from [address] [place] [address] for cremation was placed on Dr Ebony Canales for completion

## 2017-10-16 ENCOUNTER — DOCUMENTATION ONLY (OUTPATIENT)
Dept: FAMILY MEDICINE CLINIC | Age: 77
End: 2017-10-16

## 2017-10-17 ENCOUNTER — DOCUMENTATION ONLY (OUTPATIENT)
Dept: FAMILY MEDICINE CLINIC | Age: 77
End: 2017-10-17

## 2018-01-23 ENCOUNTER — DOCUMENTATION ONLY (OUTPATIENT)
Dept: FAMILY MEDICINE CLINIC | Age: 78
End: 2018-01-23

## 2019-10-07 NOTE — PROGRESS NOTES
9/25/17, Per Chart Review, Patient kept F/U appt with Dr Grazyna Hadley, 9/5/17, [facility] of [address].  9/25/17, This writer/NN contacted Ricarda Kearney, [phone], spoke to [facility] AT [facility] patient remains at facility at this time, however, nurse not available at this time, agrees to keep IFP/NN contact information to call with further updates, questions or concerns. See Previous NN/Patient Outreach Documentation:  9/5/17, Patient listed on Daily Census/MSSP Report with alert of [facility]/ED visit. This writer/NN and [facility] access, able to confirm, patient seen, 9/1/17 at [facility]/ED related to Right Lower Extremity Swelling, negative Doppler, no need for hospitalization, given Lasix with good diuresis/improvement in swelling. Instruction on importance of leg elevation and benefits of compression stockings. Recommended continue Lasix, 20mg, daily for 3 days. (BUN/Cr at 21/1.44)Printed ED Provider note for Dr Grazyna Hadley to review, as patient has scheduled F/U appt with Dr Grazyna Hadley, today, 9/5/17 at 1:15pm.   9/5/17, Patient kept F/U appt with Dr Grazyna Hadley today, 9/5/17.  9/5/17, This writer/NN received return call from [facility], Skilled Nurse, Mery, [phone], states patient should have received Lasix, 9/3/17, 9/4/17 and 9/5/17. Mery states patient upset with wheelchair, states scooter gave him more independence. Mery states therapy also continues to work with patient. This writer/NN agrees to return call with further orders or concerns per Dr Grazyna Hadley. Mery agrees to keep IFP/NN contact information to call with further questions or concerns. PLAN: Continue to assist with Transitions of Care. Discuss further symptoms or concerns,  medications(compliance and reconciliation), HH progress and F/U appts.  Provide instruction, goal work and resource connection as indicated  See Previous NN/Patient Outreach Documentation:  8/18/17, This writer/NN received return call from [facility], Skilled Nurse, Mery, [phone], [facility] made visit today, found patient attempting to get out of bed(around 1:30pm, had not been taken to lunch), able to get staff to assist and lunch ordered. Yuni Herrera states working with wife to discuss next/long term plans, [facility] patient is on waiting list at the [address] [place] [address], [place] and [place], however, may be year waiting, PT to assess if inpatient REHAB may be option. Yuni Herrera agrees to also home/hospice services. Yuni Herrera [facility] next scheduled visit is Tuesday, 8/22/17, plans to continue twice weekly visits at this time, agrees to keep IFP/NN contact information to call with further updates, questions or concerns. See Previous NN/Patient Outreach Documentation:  8/18/17, NN Documentation Note. Patient kept F/U appt with Dr Scar Chase, 8/17/17. Patient remains at [facility]. Dr Scar Chase plans to discuss Hospice Services at next office visit, states [facility] is currently assisting(they have a Hospice division). This writer/NN agrees to reach out to [facility]. 8/18/17, This writer/NN contacted [facility], [phone], spoke to Radha Bella, [facility] services began 8/14/17 and next scheduled visit is 8/18/17, agrees to send message to nurse with IFP/NN contact information and request for return call. 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 8/15/17, This writer/NN and [facility] access, able to confirm, above hospitalization, printed discharge summary for Dr Scar Chase to review.    8/15/17, This writer/NN contacted [facility], [phone], spoke to Nursing Staff, Zakia Jiménez, states patient has returned to facility, agrees to have medication reconciliation faxed to Dr Chris Adame, as patient has scheduled F/U appt 8/17/17, and agrees to keep IFP/NN contact information to call with further questions or concerns. Xiang Silvestre states HH has not started at this time. 8/15/17, This writer/NN contacted At [facility], spoke to Norma(will be out of [place] office, [phone]), states have not been notified of discharge. This writer/NN explained above hospitalization and outreach to [facility], patient currently there. Marimar Haque understanding, agrees to notify  of discharge and keep IFP/NN contact information to call with further questions or concerns. See Previous NN/Patient Outreach Documentation:  F/U appt has been changed to 8/17/17 at 2:10pm  8/14/17, This writer/NN contacted At [facility], [phone], to discuss above hospitalization. Marcelle Chery understanding and agrees to have Liaison reach out to [facility]. Sadiq Meyers agrees to keep IFP/NN contact information to call with further updates, questions or concerns. See Previous NN/Patient Outreach Documentation:  S/P [facility] ED, 6/27/17 related to Right Great Toe Injury. 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  8/11/17, Care Coordination Documentation:   This writer/NN received message from CCT/Margaret ANDRADE, states:  [facility], Plan for DC to [facility] on 8/11 with At [address] to be provided through [address] to receive wound care 3 times per week.  PCP follow up 8/14 at 2:30 PM with Dr. Chris Adame  Wife is primary caregiver, also cares for adult daughter in the home  8/11/17, This writer/NN contacted Melissa, [phone], patient just arriving, Nurse Briseyda Adhikari states not aware of above appt, states usually cannot assist with transportation after 1pm, states wife coming in to see patient and will discuss further with her and ask that she return call. 8/11/17, This writer/ S [facility], [phone], spoke to Aleisha Walker, states has not received intake information, states Liaison is Dirk Barnes, will reach out and request return call. 8/11/17, This writer/NN contacted [address] [place] [address], [phone], spoke to Isabel, \[facility]\"" Will Orozco unavailable at this time, agrees to provide message to discuss F/U appt, [place] agency and request for return call. 8/11/17, Per chart review, appt has been changed to 8/17/17 at 2:10pm  See Previous NN/Patient Outreach Documentation:  8/1/17, This writer/NN contacted [facility], [phone], spoke to Sreekanth Gonsalez, able to confirm patient remains at facility at this time. See Previous NN/Patient Outreach Documentation:  Patient seen at [facility] ED, 6/27/17 related to Right Great Toe Injury. 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 Printed ED Provider Note for Dr Kiah Montiel to review. Per Documentation, Compression placed, Surgifoam placed over skin tear around nail bed. Hold COUMADIN Dose today, 6/27/17.  6/27/17, This writer/NN contacted [address] [place] [address], [phone], able to confirm patient has returned to facility, nurse today, 6/27/17 will be Massiel, however, not available for call at this time, will provide message with IFP/NN contact information, above recommendation to hold Coumadin dose today, 6/27/17, and call with further questions or concerns.    See Previous NN/Patient Outreach Documentation:  Patient currently at [address] [place] [address]. 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   6/15/17, This writer/NN [facility] [id number], [phone], spoke to Isabel, able to confirm patient remains at facility, spoke to Nurse, Krista Clolins, states no mention of discharge at this time, agrees to leave message for , Rosalina Ledezma, with IFP/NN Contact information and request for return call. See Previous NN/Patient Outreach Documentation:  5/24/17, This writer/NN contacted [facility], [phone], spoke to Manuel Perry, able to confirm patient remains at facility in room 408, however, , Rosalina Ledezma, not available at this time, on hold and finally allowed to speak to Nurse, Massiel, states patient has been up most of the day, staples recently out, continues to participate in therapies and wife visits almost daily. Massiel agrees to provide IFP/NN contact information to have Rosalina Ledezma return call with further discharge plans, updates or concerns. See Previous NN/Patient Outreach Documentation:  4/28/17, Patient listed on Daily Census/MSSP Report with alert of [facility]/Hospitalization. This writer/NN and [facility] access, able to confirm, patient admitted to [facility] related to Left leg pain/numbness(history of PVD with multiple bypasses and stents, followed by Dr Antoine Archuleta). Printed H&P, Arteriogram/Angioplasty Report and recent progress note for Dr Melinda Augustin to review, as patient seen, 3/23/17. 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 Left lower extremity cold to touch(mid calf to foot), decreased motor and sensory to left foot.  Right Groin with Lysis Catheter in place/IR assisting Clear bilaterally, pupils equal, round and reactive to light.

## 2023-06-22 NOTE — TELEPHONE ENCOUNTER
Spoke to Yadira Loera, pt's nurse what dose coumadin pt is on and she confirmed that pt is on 6 mg per day. I informed to decrease dose to 5 mg per day and recheck on 9/5/17. Yaidra Loera verbalized understanding. [employer] Pregnancy And Lactation Text: This medication is Pregnancy Category D and isn't considered safe during pregnancy. It is unknown if this medication is excreted in breast milk.